# Patient Record
Sex: FEMALE | Race: ASIAN | NOT HISPANIC OR LATINO | URBAN - METROPOLITAN AREA
[De-identification: names, ages, dates, MRNs, and addresses within clinical notes are randomized per-mention and may not be internally consistent; named-entity substitution may affect disease eponyms.]

---

## 2021-03-27 LAB — HBA1C MFR BLD HPLC: 7.5 %

## 2021-04-05 ENCOUNTER — OFFICE VISIT (OUTPATIENT)
Dept: FAMILY MEDICINE CLINIC | Facility: CLINIC | Age: 56
End: 2021-04-05
Payer: COMMERCIAL

## 2021-04-05 VITALS
DIASTOLIC BLOOD PRESSURE: 90 MMHG | HEART RATE: 100 BPM | BODY MASS INDEX: 26.7 KG/M2 | TEMPERATURE: 98.6 F | SYSTOLIC BLOOD PRESSURE: 170 MMHG | WEIGHT: 136 LBS | HEIGHT: 60 IN | OXYGEN SATURATION: 98 %

## 2021-04-05 DIAGNOSIS — Z12.11 ENCOUNTER FOR SCREENING COLONOSCOPY: ICD-10-CM

## 2021-04-05 DIAGNOSIS — Z11.59 NEED FOR HEPATITIS C SCREENING TEST: ICD-10-CM

## 2021-04-05 DIAGNOSIS — E11.65 TYPE 2 DIABETES MELLITUS WITH HYPERGLYCEMIA, WITHOUT LONG-TERM CURRENT USE OF INSULIN (HCC): Primary | ICD-10-CM

## 2021-04-05 DIAGNOSIS — Z23 ENCOUNTER FOR IMMUNIZATION: ICD-10-CM

## 2021-04-05 DIAGNOSIS — R03.0 ELEVATED BLOOD PRESSURE READING IN OFFICE WITHOUT DIAGNOSIS OF HYPERTENSION: ICD-10-CM

## 2021-04-05 DIAGNOSIS — H91.93 BILATERAL HEARING LOSS, UNSPECIFIED HEARING LOSS TYPE: ICD-10-CM

## 2021-04-05 DIAGNOSIS — Z12.31 ENCOUNTER FOR SCREENING MAMMOGRAM FOR BREAST CANCER: ICD-10-CM

## 2021-04-05 DIAGNOSIS — E11.65 TYPE 2 DIABETES MELLITUS WITH HYPERGLYCEMIA, WITHOUT LONG-TERM CURRENT USE OF INSULIN (HCC): ICD-10-CM

## 2021-04-05 DIAGNOSIS — E66.3 OVERWEIGHT WITH BODY MASS INDEX (BMI) OF 26 TO 26.9 IN ADULT: ICD-10-CM

## 2021-04-05 PROCEDURE — 3008F BODY MASS INDEX DOCD: CPT | Performed by: FAMILY MEDICINE

## 2021-04-05 PROCEDURE — 99204 OFFICE O/P NEW MOD 45 MIN: CPT | Performed by: FAMILY MEDICINE

## 2021-04-05 PROCEDURE — 3725F SCREEN DEPRESSION PERFORMED: CPT | Performed by: FAMILY MEDICINE

## 2021-04-05 PROCEDURE — 1036F TOBACCO NON-USER: CPT | Performed by: FAMILY MEDICINE

## 2021-04-05 PROCEDURE — 90715 TDAP VACCINE 7 YRS/> IM: CPT | Performed by: FAMILY MEDICINE

## 2021-04-05 PROCEDURE — 90732 PPSV23 VACC 2 YRS+ SUBQ/IM: CPT | Performed by: FAMILY MEDICINE

## 2021-04-05 PROCEDURE — 90472 IMMUNIZATION ADMIN EACH ADD: CPT | Performed by: FAMILY MEDICINE

## 2021-04-05 PROCEDURE — 90471 IMMUNIZATION ADMIN: CPT | Performed by: FAMILY MEDICINE

## 2021-04-05 RX ORDER — METHOTREXATE 2.5 MG/1
TABLET ORAL
COMMUNITY
Start: 2021-02-08

## 2021-04-06 PROBLEM — E11.65 TYPE 2 DIABETES MELLITUS WITH HYPERGLYCEMIA, WITHOUT LONG-TERM CURRENT USE OF INSULIN (HCC): Status: ACTIVE | Noted: 2021-04-06

## 2021-04-06 PROBLEM — H91.93 BILATERAL HEARING LOSS: Status: ACTIVE | Noted: 2021-04-06

## 2021-04-06 PROBLEM — R03.0 ELEVATED BLOOD PRESSURE READING IN OFFICE WITHOUT DIAGNOSIS OF HYPERTENSION: Status: ACTIVE | Noted: 2021-04-06

## 2021-04-06 PROBLEM — E66.3 OVERWEIGHT WITH BODY MASS INDEX (BMI) OF 26 TO 26.9 IN ADULT: Status: ACTIVE | Noted: 2021-04-06

## 2021-05-16 ENCOUNTER — IMMUNIZATIONS (OUTPATIENT)
Dept: FAMILY MEDICINE CLINIC | Facility: HOSPITAL | Age: 56
End: 2021-05-16

## 2021-05-16 DIAGNOSIS — Z23 ENCOUNTER FOR IMMUNIZATION: Primary | ICD-10-CM

## 2021-05-16 PROCEDURE — 0001A SARS-COV-2 / COVID-19 MRNA VACCINE (PFIZER-BIONTECH) 30 MCG: CPT

## 2021-05-16 PROCEDURE — 91300 SARS-COV-2 / COVID-19 MRNA VACCINE (PFIZER-BIONTECH) 30 MCG: CPT

## 2021-06-05 ENCOUNTER — IMMUNIZATIONS (OUTPATIENT)
Dept: FAMILY MEDICINE CLINIC | Facility: HOSPITAL | Age: 56
End: 2021-06-05

## 2021-06-05 DIAGNOSIS — Z23 ENCOUNTER FOR IMMUNIZATION: Primary | ICD-10-CM

## 2021-06-05 PROCEDURE — 91300 SARS-COV-2 / COVID-19 MRNA VACCINE (PFIZER-BIONTECH) 30 MCG: CPT

## 2021-06-05 PROCEDURE — 0002A SARS-COV-2 / COVID-19 MRNA VACCINE (PFIZER-BIONTECH) 30 MCG: CPT

## 2021-06-19 ENCOUNTER — APPOINTMENT (OUTPATIENT)
Dept: LAB | Facility: CLINIC | Age: 56
End: 2021-06-19
Payer: COMMERCIAL

## 2021-06-19 DIAGNOSIS — Z11.59 NEED FOR HEPATITIS C SCREENING TEST: ICD-10-CM

## 2021-06-19 DIAGNOSIS — E11.65 TYPE 2 DIABETES MELLITUS WITH HYPERGLYCEMIA, WITHOUT LONG-TERM CURRENT USE OF INSULIN (HCC): ICD-10-CM

## 2021-06-19 LAB
ANION GAP SERPL CALCULATED.3IONS-SCNC: 10 MMOL/L (ref 4–13)
BUN SERPL-MCNC: 17 MG/DL (ref 5–25)
CALCIUM SERPL-MCNC: 8.8 MG/DL (ref 8.3–10.1)
CHLORIDE SERPL-SCNC: 105 MMOL/L (ref 100–108)
CHOLEST SERPL-MCNC: 191 MG/DL (ref 50–200)
CO2 SERPL-SCNC: 26 MMOL/L (ref 21–32)
CREAT SERPL-MCNC: 0.75 MG/DL (ref 0.6–1.3)
CREAT UR-MCNC: 97.5 MG/DL
EST. AVERAGE GLUCOSE BLD GHB EST-MCNC: 137 MG/DL
GFR SERPL CREATININE-BSD FRML MDRD: 88 ML/MIN/1.73SQ M
GLUCOSE P FAST SERPL-MCNC: 120 MG/DL (ref 65–99)
HBA1C MFR BLD: 6.4 %
HCV AB SER QL: NORMAL
HDLC SERPL-MCNC: 50 MG/DL
LDLC SERPL CALC-MCNC: 125 MG/DL (ref 0–100)
MICROALBUMIN UR-MCNC: 22.8 MG/L (ref 0–20)
MICROALBUMIN/CREAT 24H UR: 23 MG/G CREATININE (ref 0–30)
NONHDLC SERPL-MCNC: 141 MG/DL
POTASSIUM SERPL-SCNC: 4.2 MMOL/L (ref 3.5–5.3)
SODIUM SERPL-SCNC: 141 MMOL/L (ref 136–145)
TRIGL SERPL-MCNC: 80 MG/DL

## 2021-06-19 PROCEDURE — 3061F NEG MICROALBUMINURIA REV: CPT | Performed by: FAMILY MEDICINE

## 2021-06-19 PROCEDURE — 82043 UR ALBUMIN QUANTITATIVE: CPT

## 2021-06-19 PROCEDURE — 80048 BASIC METABOLIC PNL TOTAL CA: CPT

## 2021-06-19 PROCEDURE — 83036 HEMOGLOBIN GLYCOSYLATED A1C: CPT

## 2021-06-19 PROCEDURE — 36415 COLL VENOUS BLD VENIPUNCTURE: CPT

## 2021-06-19 PROCEDURE — 80061 LIPID PANEL: CPT

## 2021-06-19 PROCEDURE — 3044F HG A1C LEVEL LT 7.0%: CPT | Performed by: FAMILY MEDICINE

## 2021-06-19 PROCEDURE — 82570 ASSAY OF URINE CREATININE: CPT

## 2021-06-19 PROCEDURE — 86803 HEPATITIS C AB TEST: CPT

## 2021-08-05 ENCOUNTER — OFFICE VISIT (OUTPATIENT)
Dept: GASTROENTEROLOGY | Facility: CLINIC | Age: 56
End: 2021-08-05
Payer: COMMERCIAL

## 2021-08-05 VITALS
WEIGHT: 127 LBS | HEART RATE: 81 BPM | DIASTOLIC BLOOD PRESSURE: 114 MMHG | BODY MASS INDEX: 24.94 KG/M2 | SYSTOLIC BLOOD PRESSURE: 184 MMHG | HEIGHT: 60 IN

## 2021-08-05 DIAGNOSIS — Z12.11 ENCOUNTER FOR SCREENING COLONOSCOPY: ICD-10-CM

## 2021-08-05 DIAGNOSIS — Z12.12 ENCOUNTER FOR SCREENING FOR MALIGNANT NEOPLASM OF RECTUM: Primary | ICD-10-CM

## 2021-08-05 DIAGNOSIS — M06.9 RHEUMATOID ARTHRITIS, INVOLVING UNSPECIFIED SITE, UNSPECIFIED WHETHER RHEUMATOID FACTOR PRESENT (HCC): ICD-10-CM

## 2021-08-05 PROCEDURE — 99244 OFF/OP CNSLTJ NEW/EST MOD 40: CPT | Performed by: INTERNAL MEDICINE

## 2021-08-05 RX ORDER — FOLIC ACID 1 MG/1
TABLET ORAL DAILY
COMMUNITY

## 2021-08-05 NOTE — PROGRESS NOTES
[facility] - Outpatient Consultation  Clarita Villegas 60 y o  female MRN: [id number]  Encounter: [id number]          ASSESSMENT AND PLAN:      1  Encounter for screening for malignant neoplasm of rectum    2  Encounter for screening colonoscopy  -     Ambulatory referral for colonoscopy    3  Rheumatoid arthritis, involving unspecified site, unspecified whether rheumatoid factor present [facility])      Patient evaluated for screening for colorectal cancer, colonoscopy procedure, options and prep discussed at length with patient and her son  Schedule 1st screening colonoscopy  But they want to wait since she is traveling to [facility] will be back in October, she wants to call at that time to schedule   ______________________________________________________________________    HPI:    Thank you for asking me to see this patient for evaluation for screening for colorectal cancer  She was accompanied by her son who helped her communicate  Patient denies any blood in stools melena hematochezia abdominal pain nausea vomiting dysphagia coughing choking spells excessive diarrhea constipation acid reflux heartburn indigestion nocturnal symptoms bronchitis pneumonias  Appetite is fair weight stable  Denies any fever chills rash psych ENT  problems  Diabetes well managed  Has lost some weight  Diet medications more than 10 pertinent systems were reviewed  REVIEW OF SYSTEMS:    CONSTITUTIONAL: Denies any fever, chills, rigors, and weight loss  HEENT: No earache or tinnitus  CARDIOVASCULAR: No chest pain or palpitations  RESPIRATORY: Denies any cough, hemoptysis, shortness of breath or dyspnea on exertion  GASTROINTESTINAL: As noted in the History of Present Illness  GENITOURINARY: Denies any hematuria or dysuria  NEUROLOGIC: No dizziness or vertigo  MUSCULOSKELETAL: Denies any joint swellings  SKIN: Denies skin rashes or itching     ENDOCRINE: Denies excessive thirst  Denies intolerance to heat or cold  PSYCHOSOCIAL: Denies depression or anxiety  Denies any recent memory loss  Historical Information   Past Medical History:   Diagnosis Date    Diabetes mellitus ([facility] )     Rheumatoid arthritis ([facility] )     Rheumatoid arthritis ([facility] )      History reviewed  No pertinent surgical history  Social History   Social History     Substance and Sexual Activity   Alcohol Use Not Currently     Social History     Substance and Sexual Activity   Drug Use Not on file     Social History     Tobacco Use   Smoking Status Never Smoker   Smokeless Tobacco Never Used     Family History   Problem Relation Age of Onset    Diabetes Father        Meds/Allergies       Current Outpatient Medications:     folic acid (FOLVITE) 1 mg tablet    metFORMIN (GLUCOPHAGE) 500 mg tablet    methotrexate 2 5 MG tablet    No Known Allergies        Objective     Blood pressure (!) 184/114, pulse 81, height 5' (1 524 m), weight 57 6 kg (127 lb)  Body mass index is 24 8 kg/m²  PHYSICAL EXAM:      General Appearance:   Alert, cooperative, no distress   HEENT:   Normocephalic, atraumatic, anicteric  Neck:  Supple, symmetrical, trachea midline   Lungs:   Clear to auscultation bilaterally; no rales, rhonchi or wheezing; respirations unlabored    Heart[de-identified]   Regular rate and rhythm; no murmur  Abdomen:   Soft, non-tender, non-distended; normal bowel sounds; no masses, no organomegaly    Genitalia:   Deferred    Rectal:   Deferred    Extremities:  No cyanosis, clubbing or edema    Skin:  No jaundice, rashes, or lesions    Lymph nodes:  No palpable cervical lymphadenopathy        Lab Results:   No visits with results within 1 Day(s) from this visit     Latest known visit with results is:   Appointment on 06/19/2021   Component Date Value    Cholesterol 06/19/2021 191     Triglycerides 06/19/2021 80     HDL, Direct 06/19/2021 50     LDL Calculated 06/19/2021 125*    Non-HDL-Chol (CHOL-HDL) 06/19/2021 141     Hemoglobin A1C 06/19/2021 6 4*    EAG 06/19/2021 137     Creatinine, Ur 06/19/2021 97 5     Microalbum  ,U,Random 06/19/2021 22 8*    Microalb Creat Ratio 06/19/2021 23     Sodium 06/19/2021 141     Potassium 06/19/2021 4 2     Chloride 06/19/2021 105     CO2 06/19/2021 26     ANION GAP 06/19/2021 10     BUN 06/19/2021 17     Creatinine 06/19/2021 0 75     Glucose, Fasting 06/19/2021 120*    Calcium 06/19/2021 8 8     eGFR 06/19/2021 88     Hepatitis C Ab 06/19/2021 Non-reactive          Radiology Results:   No results found

## 2021-08-09 ENCOUNTER — OFFICE VISIT (OUTPATIENT)
Dept: FAMILY MEDICINE CLINIC | Facility: CLINIC | Age: 56
End: 2021-08-09
Payer: COMMERCIAL

## 2021-08-09 VITALS
RESPIRATION RATE: 16 BRPM | HEIGHT: 60 IN | OXYGEN SATURATION: 98 % | DIASTOLIC BLOOD PRESSURE: 100 MMHG | SYSTOLIC BLOOD PRESSURE: 172 MMHG | TEMPERATURE: 97.5 F | WEIGHT: 128 LBS | HEART RATE: 88 BPM | BODY MASS INDEX: 25.13 KG/M2

## 2021-08-09 DIAGNOSIS — I10 ESSENTIAL HYPERTENSION: Primary | ICD-10-CM

## 2021-08-09 DIAGNOSIS — Z00.01 ENCOUNTER FOR GENERAL ADULT MEDICAL EXAMINATION WITH ABNORMAL FINDINGS: ICD-10-CM

## 2021-08-09 DIAGNOSIS — E78.2 MIXED HYPERLIPIDEMIA: ICD-10-CM

## 2021-08-09 DIAGNOSIS — E66.3 OVERWEIGHT WITH BODY MASS INDEX (BMI) OF 25 TO 25.9 IN ADULT: ICD-10-CM

## 2021-08-09 DIAGNOSIS — E11.65 TYPE 2 DIABETES MELLITUS WITH HYPERGLYCEMIA, WITHOUT LONG-TERM CURRENT USE OF INSULIN (HCC): ICD-10-CM

## 2021-08-09 DIAGNOSIS — Z12.4 CERVICAL CANCER SCREENING: ICD-10-CM

## 2021-08-09 PROCEDURE — 3725F SCREEN DEPRESSION PERFORMED: CPT | Performed by: FAMILY MEDICINE

## 2021-08-09 PROCEDURE — 1036F TOBACCO NON-USER: CPT | Performed by: FAMILY MEDICINE

## 2021-08-09 PROCEDURE — 3008F BODY MASS INDEX DOCD: CPT | Performed by: FAMILY MEDICINE

## 2021-08-09 PROCEDURE — 99214 OFFICE O/P EST MOD 30 MIN: CPT | Performed by: FAMILY MEDICINE

## 2021-08-09 PROCEDURE — 99396 PREV VISIT EST AGE 40-64: CPT | Performed by: FAMILY MEDICINE

## 2021-08-09 RX ORDER — ATORVASTATIN CALCIUM 40 MG/1
40 TABLET, FILM COATED ORAL DAILY
Qty: 90 TABLET | Refills: 0 | Status: SHIPPED | OUTPATIENT
Start: 2021-08-09 | End: 2022-01-06

## 2021-08-09 NOTE — PROGRESS NOTES
Subjective:      Patient ID: Paulette Hayes is a 60 y o  female  Here follow up Gage Penaloza, her BP has been running high-170s-180s/100-110s   Rheumatoid arthritis- diagnosed few years ago, sees Dr Elizabeth Schneider and is on chronic methotrexate therapy, at the time of diagnosis she was on chronic prednisone and now has not required prednisone in over 2-3 years  She gets her routine labs every 3 months from rheumatology ,last labs in 6/2021  Type 2 diabetes mellitus-diagnosed likely prior to 2019,she has been compliant with metformin and tolerating it well, she has lost weight and has been compliant with diet and exercise- son wants her off medications if possible in future  Essential hypertension- I had previously told her that her BP is high and she will need medicatoins, however the son who works in Cardiology lab for [employer] states that he checked it at home and it was fine for 1 week and hence he thought it was not necessary to follow up, she has been more compliant with diet  At her most recent appointment with Dr Belle Beebe it was elevated and Wesley Mackenzie had contacted me on tiger connect over the weekend , she was asymptomatic and hence I had advised her to come into the office today  She denies any headache, chest pain, shortness of breath, but does feel tired and reports lack of appetite for few days  No visual changes  Overweight-she does watch her diet and eats simple meals, she does not exercise formally but enjoys house work and walking      Past Medical History:   Diagnosis Date    Diabetes mellitus ([facility] )     Rheumatoid arthritis ([facility] )     Rheumatoid arthritis ([facility] )        Family History   Problem Relation Age of Onset    Diabetes Father        History reviewed  No pertinent surgical history  reports that she has never smoked  She has never used smokeless tobacco  She reports previous alcohol use        Current Outpatient Medications:     folic acid (FOLVITE) 1 mg tablet, Take by mouth daily, Disp: , Rfl:     metFORMIN (GLUCOPHAGE) 1000 MG tablet, Take 1 tablet (1,000 mg total) by mouth 2 (two) times a day with meals, Disp: 180 tablet, Rfl: 0    methotrexate 2 5 MG tablet, 3 tablet once a week, Disp: , Rfl:     atorvastatin (LIPITOR) 40 mg tablet, Take 1 tablet (40 mg total) by mouth daily, Disp: 90 tablet, Rfl: 0    benazepril-hydrochlorthiazide (LOTENSIN HCT) 20-25 MG per tablet, Take 1 tablet by mouth daily, Disp: 30 tablet, Rfl: 2    The following portions of the patient's history were reviewed and updated as appropriate: allergies, current medications, past family history, past medical history, past social history, past surgical history and problem list     Review of Systems   Constitutional: Positive for appetite change and fatigue  Negative for fever  HENT: Negative for congestion, facial swelling, mouth sores, rhinorrhea, sore throat and trouble swallowing  Eyes: Negative for pain and redness  Respiratory: Negative for cough, shortness of breath and wheezing  Cardiovascular: Negative for chest pain, palpitations and leg swelling  Gastrointestinal: Negative for abdominal pain, blood in stool, constipation, diarrhea and nausea  Genitourinary: Negative for dysuria, hematuria and urgency  Musculoskeletal: Negative for arthralgias, back pain and myalgias  Skin: Negative for rash and wound  Neurological: Negative for seizures, syncope and headaches  Hematological: Negative for adenopathy  Psychiatric/Behavioral: Negative for agitation and behavioral problems           PHQ-9 Depression Screening    PHQ-9:   Frequency of the following problems over the past two weeks:      Little interest or pleasure in doing things: 0 - not at all  Feeling down, depressed, or hopeless: 0 - not at all  PHQ-2 Score: 0       [unfilled]    Objective:    BP (!) 172/100 (BP Location: Right arm, Patient Position: Sitting, Cuff Size: Adult)   Pulse 88   Temp 97 5 °F (36 4 °C) (Temporal)   Resp 16   Ht 5' (1 524 m)   Wt 58 1 kg (128 lb)   SpO2 98%   BMI 25 00 kg/m²      Physical Exam  Vitals and nursing note reviewed  Constitutional:       Appearance: Normal appearance  She is well-developed  She is not ill-appearing  HENT:      Head: Normocephalic and atraumatic  Right Ear: External ear normal       Left Ear: External ear normal       Nose: Nose normal       Mouth/Throat:      Mouth: Mucous membranes are moist       Pharynx: No oropharyngeal exudate or posterior oropharyngeal erythema  Eyes:      General: No scleral icterus  Right eye: No discharge  Left eye: No discharge  Conjunctiva/sclera: Conjunctivae normal    Cardiovascular:      Rate and Rhythm: Normal rate  Heart sounds: No murmur heard  No gallop  Pulmonary:      Effort: Pulmonary effort is normal  No respiratory distress  Breath sounds: Normal breath sounds  No stridor  No wheezing, rhonchi or rales  Musculoskeletal:         General: No tenderness or deformity  Skin:     Findings: No erythema or rash  Neurological:      Mental Status: She is alert  Mental status is at baseline  Psychiatric:         Behavior: Behavior normal          Judgment: Judgment normal            Recent Results (from the past 2520 hour(s))   Lipid panel    Collection Time: 06/19/21  9:37 AM   Result Value Ref Range    Cholesterol 191 50 - 200 mg/dL    Triglycerides 80 <=150 mg/dL    HDL, Direct 50 >=40 mg/dL    LDL Calculated 125 (H) 0 - 100 mg/dL    Non-HDL-Chol (CHOL-HDL) 141 mg/dl   HEMOGLOBIN A1C W/ EAG ESTIMATION    Collection Time: 06/19/21  9:37 AM   Result Value Ref Range    Hemoglobin A1C 6 4 (H) Normal 3 8-5 6%; PreDiabetic 5 7-6 4%; Diabetic >=6 5%; Glycemic control for adults with diabetes <7 0% %     mg/dl   Microalbumin / creatinine urine ratio    Collection Time: 06/19/21  9:37 AM   Result Value Ref Range    Creatinine, Ur 97 5 mg/dL    Microalbum  ,U,Random 22 8 (H) 0 0 - 20 0 mg/L    Microalb Creat Ratio 23 0 - 30 mg/g creatinine   Basic metabolic panel    Collection Time: 06/19/21  9:37 AM   Result Value Ref Range    Sodium 141 136 - 145 mmol/L    Potassium 4 2 3 5 - 5 3 mmol/L    Chloride 105 100 - 108 mmol/L    CO2 26 21 - 32 mmol/L    ANION GAP 10 4 - 13 mmol/L    BUN 17 5 - 25 mg/dL    Creatinine 0 75 0 60 - 1 30 mg/dL    Glucose, Fasting 120 (H) 65 - 99 mg/dL    Calcium 8 8 8 3 - 10 1 mg/dL    eGFR 88 ml/min/1 73sq m   Hepatitis C Antibody ([facility])    Collection Time: 06/19/21  9:37 AM   Result Value Ref Range    Hepatitis C Ab Non-reactive Non-reactive       Laboratory Results: I have personally reviewed the pertinent laboratory results/reports     Radiology/Other Diagnostic Testing Results: I have personally reviewed pertinent reports  Assessment/Plan:         Diagnoses and all orders for this visit:    Essential hypertension  -     Basic metabolic panel; Future  -     CBC and differential; Future  -     benazepril-hydrochlorthiazide (LOTENSIN HCT) 20-25 MG per tablet; Take 1 tablet by mouth daily    Mixed hyperlipidemia  -     atorvastatin (LIPITOR) 40 mg tablet; Take 1 tablet (40 mg total) by mouth daily    Type 2 diabetes mellitus with hyperglycemia, without long-term current use of insulin (HCC)  -     metFORMIN (GLUCOPHAGE) 1000 MG tablet; Take 1 tablet (1,000 mg total) by mouth 2 (two) times a day with meals  -     Comprehensive metabolic panel; Future  -     CBC and differential; Future  -     HEMOGLOBIN A1C W/ EAG ESTIMATION; Future    Encounter for general adult medical examination with abnormal findings    Cervical cancer screening  -     Ambulatory referral to Gynecology; Future    Overweight with body mass index (BMI) of 25 to 25 9 in adult      Discussed that given ASCVD risk is high due to BP elevation she needs statin, start atorvastatin 40 mg daily -discussed to report any myalgias and stay hydrated    Start benazepril-hctz for hypertension, <2000 mg sodium in diet, daily 20 min exercise and repeat BMP in 3-4 weeks after starting ACEI, discussed common side effects  Refer for PAP with Dr Brody Mckay  F/u IN 4 WEEKS FOR RN visit for BP check and with me in 3 months  BP today:  Vitals:    08/09/21 0837   BP: (!) 172/100   Pulse: 88   Resp: 16   Temp: 97 5 °F (36 4 °C)   SpO2: 98%     Current blood pressure goal based on your age and co-morbidities is 140/90 mm Hg  Currently blood pressure is not at goal    Continue taking your new medications  Discussed red flag symptoms including intractable throbbing headache, visual changes, blurred vision, tingling numbness of any extremity, neurological signs like facial droop, slurred speech, chest pain  Call 9-1-1 and go to ER if these develop  Patient understood and verbalized understanding  Discussed to take metformin once daily as she is fatigued and appetite has decreased- likely due to metformin and tight BG control  Read package inserts for all medications before starting a new medications, call me if you have any questions  Patient was given opportunity to ask questions and all questions were answered  Portions of the record may have been created with voice recognition software  Occasional wrong word or "sound a like" substitutions may have occurred due to the inherent limitations of voice recognition software  Read the chart carefully and recognize, using context, where substitutions have occurred

## 2021-08-09 NOTE — PROGRESS NOTES
[facility]    NAME: Aisha Kinney  AGE: 60 y o  SEX: female  : 1965     DATE: 2021     Assessment and Plan:     Problem List Items Addressed This Visit        Endocrine    Type 2 diabetes mellitus with hyperglycemia, without long-term current use of insulin (HCC)    Relevant Medications    metFORMIN (GLUCOPHAGE) 1000 MG tablet    Other Relevant Orders    Comprehensive metabolic panel    CBC and differential    HEMOGLOBIN A1C W/ EAG ESTIMATION      Other Visit Diagnoses     Encounter for general adult medical examination with abnormal findings    -  Primary    Mixed hyperlipidemia        Relevant Medications    atorvastatin (LIPITOR) 40 mg tablet    Cervical cancer screening        Relevant Orders    Ambulatory referral to Gynecology    Essential hypertension        Relevant Orders    Basic metabolic panel    CBC and differential    Overweight with body mass index (BMI) of 25 to 25 9 in adult            Advised to schedule Eye exam, mammogram and PAP smear= previously saw Dr Ted Keene will do her colonoscopy in Oct after returning from [facility]  See EM visit for other problems  Immunizations and preventive care screenings were discussed with patient today  Appropriate education was printed on patient's after visit summary  Counseling:  Dental Health: discussed importance of regular tooth brushing, flossing, and dental visits  Exercise: the importance of regular exercise/physical activity was discussed  Recommend exercise 3-5 times per week for at least 30 minutes  · Diabetic foot care    BMI Counseling: Body mass index is 25 kg/m²   The BMI is above normal  Nutrition recommendations include decreasing portion sizes, encouraging healthy choices of fruits and vegetables, decreasing fast food intake, consuming healthier snacks, limiting drinks that contain sugar, moderation in carbohydrate intake and reducing intake of cholesterol  Exercise recommendations include moderate physical activity 150 minutes/week  No pharmacotherapy was ordered  Return in about 3 months (around 11/9/2021) for Next scheduled follow up  Chief Complaint:     Chief Complaint   Patient presents with    Hypertension      History of Present Illness:     Adult Annual Physical   Patient here for a comprehensive physical exam  The patient reports problems - blood pressure running high-see EM visit  Diet and Physical Activity  · Diet/Nutrition: well balanced diet, low fat diet, low carb diet and consuming 3-5 servings of fruits/vegetables daily  · Exercise: walking  Depression Screening  PHQ-9 Depression Screening    PHQ-9:   Frequency of the following problems over the past two weeks:      Little interest or pleasure in doing things: 0 - not at all  Feeling down, depressed, or hopeless: 0 - not at all  PHQ-2 Score: 0       General Health  · Sleep: sleeps well  · Hearing: decreased bilateral   · Vision: no vision problems, most recent eye exam >1 year ago and has not scheduled diabetic eye exam yet  · Dental: regular dental visits  /GYN Health  · Patient is: postmenopausal  ·   · Contraceptive method: not sexually active   Review of Systems:     Review of Systems   Constitutional: Positive for appetite change and fatigue  Negative for fever  HENT: Negative for congestion, facial swelling, mouth sores, rhinorrhea, sore throat and trouble swallowing  Eyes: Negative for pain and redness  Respiratory: Negative for cough, shortness of breath and wheezing  Cardiovascular: Negative for chest pain, palpitations and leg swelling  Gastrointestinal: Negative for abdominal pain, blood in stool, constipation, diarrhea and nausea  Genitourinary: Negative for dysuria, hematuria and urgency  Musculoskeletal: Negative for arthralgias, back pain and myalgias  Skin: Negative for rash and wound  Neurological: Negative for seizures, syncope and headaches  Hematological: Negative for adenopathy  Psychiatric/Behavioral: Negative for agitation and behavioral problems  Past Medical History:     Past Medical History:   Diagnosis Date    Diabetes mellitus ([facility] )     Rheumatoid arthritis ([facility] )     Rheumatoid arthritis ([facility] )       Past Surgical History:     History reviewed  No pertinent surgical history  Social History:     Social History     Socioeconomic History    Marital status: Unknown     Spouse name: None    Number of children: None    Years of education: None    Highest education level: None   Occupational History    None   Tobacco Use    Smoking status: Never Smoker    Smokeless tobacco: Never Used   Vaping Use    Vaping Use: Never used   Substance and Sexual Activity    Alcohol use: Not Currently    Drug use: None    Sexual activity: None   Other Topics Concern    None   Social History Narrative    None     Social Determinants of Health     Financial Resource Strain:     Difficulty of Paying Living Expenses:    Food Insecurity:     Worried About Running Out of Food in the Last Year:     Ran Out of Food in the Last Year:    Transportation Needs:     Lack of Transportation (Medical):      Lack of Transportation (Non-Medical):    Physical Activity:     Days of Exercise per Week:     Minutes of Exercise per Session:    Stress:     Feeling of Stress :    Social Connections:     Frequency of Communication with Friends and Family:     Frequency of Social Gatherings with Friends and Family:     Attends [employer] Services:     Active Member of Clubs or Organizations:     Attends Club or Organization Meetings:     Marital Status:    Intimate Partner Violence:     Fear of Current or Ex-Partner:     Emotionally Abused:     Physically Abused:     Sexually Abused:       Family History:     Family History   Problem Relation Age of Onset    Diabetes Father       Current Medications:     Current Outpatient Medications   Medication Sig Dispense Refill    folic acid (FOLVITE) 1 mg tablet Take by mouth daily      metFORMIN (GLUCOPHAGE) 1000 MG tablet Take 1 tablet (1,000 mg total) by mouth 2 (two) times a day with meals 180 tablet 0    methotrexate 2 5 MG tablet 3 tablet once a week      atorvastatin (LIPITOR) 40 mg tablet Take 1 tablet (40 mg total) by mouth daily 90 tablet 0     No current facility-administered medications for this visit  Allergies:     No Known Allergies   Physical Exam:     BP (!) 172/100 (BP Location: Right arm, Patient Position: Sitting, Cuff Size: Adult)   Pulse 88   Temp 97 5 °F (36 4 °C) (Temporal)   Resp 16   Ht 5' (1 524 m)   Wt 58 1 kg (128 lb)   SpO2 98%   BMI 25 00 kg/m²     Physical Exam  Vitals and nursing note reviewed  Constitutional:       Appearance: She is well-developed  HENT:      Head: Normocephalic and atraumatic  Right Ear: External ear normal       Left Ear: External ear normal       Nose: Nose normal       Mouth/Throat:      Pharynx: No oropharyngeal exudate  Eyes:      General: No scleral icterus  Right eye: No discharge  Left eye: No discharge  Conjunctiva/sclera: Conjunctivae normal       Pupils: Pupils are equal, round, and reactive to light  Neck:      Thyroid: No thyromegaly  Vascular: No JVD  Cardiovascular:      Rate and Rhythm: Normal rate and regular rhythm  Heart sounds: No murmur heard  No friction rub  No gallop  Pulmonary:      Effort: Pulmonary effort is normal  No respiratory distress  Breath sounds: Normal breath sounds  No wheezing or rales  Abdominal:      Palpations: Abdomen is soft  Tenderness: There is no abdominal tenderness  Musculoskeletal:         General: No tenderness or deformity  Cervical back: Normal range of motion  Lymphadenopathy:      Cervical: No cervical adenopathy  Skin:     General: Skin is warm        Capillary Refill: Capillary refill takes less than 2 seconds  Findings: No erythema or rash  Neurological:      Mental Status: She is alert  Mental status is at baseline  Psychiatric:         Behavior: Behavior normal          Thought Content:  Thought content normal          Judgment: Judgment normal           Raiza Pop MD  [address] [place] [address]

## 2021-08-09 NOTE — PATIENT INSTRUCTIONS
Wellness Visit for Adults   AMBULATORY CARE:   A wellness visit  is when you see your healthcare provider to get screened for health problems  Your healthcare provider will also give you advice on how to stay healthy  Write down your questions so you remember to ask them  Ask your healthcare provider how often you should have a wellness visit  What happens at a wellness visit:  Your healthcare provider will ask about your health, and your family history of health problems  This includes high blood pressure, heart disease, and cancer  He or she will ask if you have symptoms that concern you, if you smoke, and about your mood  You may also be asked about your intake of medicines, supplements, food, and alcohol  Any of the following may be done:  · Your weight  will be checked  Your height may also be checked so your body mass index (BMI) can be calculated  Your BMI shows if you are at a healthy weight  · Your blood pressure  and heart rate will be checked  Your temperature may also be checked  · Blood and urine tests  may be done  Blood tests may be done to check your cholesterol levels  Abnormal cholesterol levels increase your risk for heart disease and stroke  You may also need a blood or urine test to check for diabetes if you are at increased risk  Urine tests may be done to look for signs of an infection or kidney disease  · A physical exam  includes checking your heartbeat and lungs with a stethoscope  Your healthcare provider may also check your skin to look for sun damage  · Screening tests  may be recommended  A screening test is done to check for diseases that may not cause symptoms  The screening tests you may need depend on your age, gender, family history, and lifestyle habits  For example, colorectal screening may be recommended if you are 48years old or older  Screening tests you need if you are a woman:   · A Pap smear  is used to screen for cervical cancer   Pap smears are usually who had chickenpox or have been exposed to the virus  How to eat healthy:  My Plate is a model for planning healthy meals  It shows the types and amounts of foods that should go on your plate  Fruits and vegetables make up about half of your plate, and grains and protein make up the other half  A serving of dairy is included on the side of your plate  The amount of calories and serving sizes you need depends on your age, gender, weight, and height  Examples of healthy foods are listed below:  · Eat a variety of vegetables  such as dark green, red, and orange vegetables  You can also include canned vegetables low in sodium (salt) and frozen vegetables without added butter or sauces  · Eat a variety of fresh fruits , canned fruit in 100% juice, frozen fruit, and dried fruit  · Include whole grains  At least half of the grains you eat should be whole grains  Examples include whole-wheat bread, wheat pasta, brown rice, and whole-grain cereals such as oatmeal     · Eat a variety of protein foods such as seafood (fish and shellfish), lean meat, and poultry without skin (turkey and chicken)  Examples of lean meats include pork leg, shoulder, or tenderloin, and beef round, sirloin, tenderloin, and extra lean ground beef  Other protein foods include eggs and egg substitutes, beans, peas, soy products, nuts, and seeds  · Choose low-fat dairy products such as skim or 1% milk or low-fat yogurt, cheese, and cottage cheese  · Limit unhealthy fats  such as butter, hard margarine, and shortening  Exercise:  Exercise at least 30 minutes per day on most days of the week  Some examples of exercise include walking, biking, dancing, and swimming  You can also fit in more physical activity by taking the stairs instead of the elevator or parking farther away from stores  Include muscle strengthening activities 2 days each week  Regular exercise provides many health benefits   It helps you manage your weight, and decreases your risk for type 2 diabetes, heart disease, stroke, and high blood pressure  Exercise can also help improve your mood  Ask your healthcare provider about the best exercise plan for you  General health and safety guidelines:   · Do not smoke  Nicotine and other chemicals in cigarettes and cigars can cause lung damage  Ask your healthcare provider for information if you currently smoke and need help to quit  E-cigarettes or smokeless tobacco still contain nicotine  Talk to your healthcare provider before you use these products  · Limit alcohol  A drink of alcohol is 12 ounces of beer, 5 ounces of wine, or 1½ ounces of liquor  · Lose weight, if needed  Being overweight increases your risk of certain health conditions  These include heart disease, high blood pressure, type 2 diabetes, and certain types of cancer  · Protect your skin  Do not sunbathe or use tanning beds  Use sunscreen with a SPF 15 or higher  Apply sunscreen at least 15 minutes before you go outside  Reapply sunscreen every 2 hours  Wear protective clothing, hats, and sunglasses when you are outside  · Drive safely  Always wear your seatbelt  Make sure everyone in your car wears a seatbelt  A seatbelt can save your life if you are in an accident  Do not use your cell phone when you are driving  This could distract you and cause an accident  Pull over if you need to make a call or send a text message  · Practice safe sex  Use latex condoms if are sexually active and have more than one partner  Your healthcare provider may recommend screening tests for sexually transmitted infections (STIs)  · Wear helmets, lifejackets, and protective gear  Always wear a helmet when you ride a bike or motorcycle, go skiing, or play sports that could cause a head injury  Wear protective equipment when you play sports  Wear a lifejacket when you are on a boat or doing water sports      © Copyright [employer] 2021 Information is for End User's use only and may not be sold, redistributed or otherwise used for commercial purposes  All illustrations and images included in CareNotes® are the copyrighted property of A D A M , Inc  or Ivana Ignacio  The above information is an  only  It is not intended as medical advice for individual conditions or treatments  Talk to your doctor, nurse or pharmacist before following any medical regimen to see if it is safe and effective for you

## 2021-08-10 ENCOUNTER — TELEPHONE (OUTPATIENT)
Dept: FAMILY MEDICINE CLINIC | Facility: CLINIC | Age: 56
End: 2021-08-10

## 2021-08-10 RX ORDER — BENAZEPRIL/HYDROCHLOROTHIAZIDE 20 MG-25MG
1 TABLET ORAL DAILY
Qty: 30 TABLET | Refills: 2 | Status: SHIPPED | OUTPATIENT
Start: 2021-08-10 | End: 2021-11-01

## 2021-08-28 ENCOUNTER — APPOINTMENT (OUTPATIENT)
Dept: LAB | Facility: CLINIC | Age: 56
End: 2021-08-28
Payer: COMMERCIAL

## 2021-08-28 DIAGNOSIS — M05.79 SEROPOSITIVE RHEUMATOID ARTHRITIS OF MULTIPLE SITES (HCC): ICD-10-CM

## 2021-08-28 DIAGNOSIS — Z79.899 ENCOUNTER FOR LONG-TERM (CURRENT) USE OF OTHER MEDICATIONS: ICD-10-CM

## 2021-08-28 LAB
ALBUMIN SERPL BCP-MCNC: 3.8 G/DL (ref 3.5–5)
ALP SERPL-CCNC: 89 U/L (ref 46–116)
ALT SERPL W P-5'-P-CCNC: 25 U/L (ref 12–78)
AST SERPL W P-5'-P-CCNC: 16 U/L (ref 5–45)
BASOPHILS # BLD AUTO: 0.08 THOUSANDS/ΜL (ref 0–0.1)
BASOPHILS NFR BLD AUTO: 1 % (ref 0–1)
BILIRUB DIRECT SERPL-MCNC: 0.13 MG/DL (ref 0–0.2)
BILIRUB SERPL-MCNC: 0.46 MG/DL (ref 0.2–1)
BUN SERPL-MCNC: 16 MG/DL (ref 5–25)
CREAT SERPL-MCNC: 0.96 MG/DL (ref 0.6–1.3)
CRP SERPL QL: 6.3 MG/L
EOSINOPHIL # BLD AUTO: 0.32 THOUSAND/ΜL (ref 0–0.61)
EOSINOPHIL NFR BLD AUTO: 5 % (ref 0–6)
ERYTHROCYTE [DISTWIDTH] IN BLOOD BY AUTOMATED COUNT: 13.8 % (ref 11.6–15.1)
ERYTHROCYTE [SEDIMENTATION RATE] IN BLOOD: 43 MM/HOUR (ref 0–29)
GFR SERPL CREATININE-BSD FRML MDRD: 65 ML/MIN/1.73SQ M
HCT VFR BLD AUTO: 38.4 % (ref 34.8–46.1)
HGB BLD-MCNC: 13.2 G/DL (ref 11.5–15.4)
IMM GRANULOCYTES # BLD AUTO: 0.03 THOUSAND/UL (ref 0–0.2)
IMM GRANULOCYTES NFR BLD AUTO: 0 % (ref 0–2)
LYMPHOCYTES # BLD AUTO: 1.9 THOUSANDS/ΜL (ref 0.6–4.47)
LYMPHOCYTES NFR BLD AUTO: 27 % (ref 14–44)
MCH RBC QN AUTO: 28.4 PG (ref 26.8–34.3)
MCHC RBC AUTO-ENTMCNC: 34.4 G/DL (ref 31.4–37.4)
MCV RBC AUTO: 83 FL (ref 82–98)
MONOCYTES # BLD AUTO: 0.61 THOUSAND/ΜL (ref 0.17–1.22)
MONOCYTES NFR BLD AUTO: 9 % (ref 4–12)
NEUTROPHILS # BLD AUTO: 4.17 THOUSANDS/ΜL (ref 1.85–7.62)
NEUTS SEG NFR BLD AUTO: 58 % (ref 43–75)
NRBC BLD AUTO-RTO: 0 /100 WBCS
PLATELET # BLD AUTO: 382 THOUSANDS/UL (ref 149–390)
PMV BLD AUTO: 9.7 FL (ref 8.9–12.7)
PROT SERPL-MCNC: 7.8 G/DL (ref 6.4–8.2)
RBC # BLD AUTO: 4.65 MILLION/UL (ref 3.81–5.12)
WBC # BLD AUTO: 7.11 THOUSAND/UL (ref 4.31–10.16)

## 2021-08-28 PROCEDURE — 85652 RBC SED RATE AUTOMATED: CPT

## 2021-08-28 PROCEDURE — 86140 C-REACTIVE PROTEIN: CPT

## 2021-08-28 PROCEDURE — 84520 ASSAY OF UREA NITROGEN: CPT

## 2021-08-28 PROCEDURE — 80076 HEPATIC FUNCTION PANEL: CPT

## 2021-08-28 PROCEDURE — 82565 ASSAY OF CREATININE: CPT

## 2021-08-28 PROCEDURE — 36415 COLL VENOUS BLD VENIPUNCTURE: CPT

## 2021-08-28 PROCEDURE — 85025 COMPLETE CBC W/AUTO DIFF WBC: CPT

## 2021-10-31 DIAGNOSIS — I10 ESSENTIAL HYPERTENSION: ICD-10-CM

## 2021-11-01 RX ORDER — BENAZEPRIL/HYDROCHLOROTHIAZIDE 20 MG-25MG
TABLET ORAL
Qty: 90 TABLET | Refills: 0 | Status: SHIPPED | OUTPATIENT
Start: 2021-11-01 | End: 2022-02-07

## 2022-01-05 DIAGNOSIS — E78.2 MIXED HYPERLIPIDEMIA: ICD-10-CM

## 2022-01-06 RX ORDER — ATORVASTATIN CALCIUM 40 MG/1
TABLET, FILM COATED ORAL
Qty: 90 TABLET | Refills: 0 | Status: SHIPPED | OUTPATIENT
Start: 2022-01-06 | End: 2022-02-25

## 2022-02-07 DIAGNOSIS — I10 ESSENTIAL HYPERTENSION: ICD-10-CM

## 2022-02-07 RX ORDER — BENAZEPRIL/HYDROCHLOROTHIAZIDE 20 MG-25MG
TABLET ORAL
Qty: 90 TABLET | Refills: 0 | Status: SHIPPED | OUTPATIENT
Start: 2022-02-07 | End: 2022-06-15 | Stop reason: SDUPTHER

## 2022-02-11 DIAGNOSIS — E11.65 TYPE 2 DIABETES MELLITUS WITH HYPERGLYCEMIA, WITHOUT LONG-TERM CURRENT USE OF INSULIN (HCC): Primary | ICD-10-CM

## 2022-02-25 DIAGNOSIS — E11.65 TYPE 2 DIABETES MELLITUS WITH HYPERGLYCEMIA, WITHOUT LONG-TERM CURRENT USE OF INSULIN (HCC): ICD-10-CM

## 2022-02-25 DIAGNOSIS — E78.2 MIXED HYPERLIPIDEMIA: ICD-10-CM

## 2022-02-25 RX ORDER — ATORVASTATIN CALCIUM 40 MG/1
TABLET, FILM COATED ORAL
Qty: 90 TABLET | Refills: 0 | Status: SHIPPED | OUTPATIENT
Start: 2022-02-25 | End: 2022-07-13 | Stop reason: SDUPTHER

## 2022-06-15 DIAGNOSIS — I10 ESSENTIAL HYPERTENSION: ICD-10-CM

## 2022-06-15 RX ORDER — BENAZEPRIL/HYDROCHLOROTHIAZIDE 20 MG-25MG
1 TABLET ORAL DAILY
Qty: 30 TABLET | Refills: 0 | Status: SHIPPED | OUTPATIENT
Start: 2022-06-15 | End: 2022-07-13 | Stop reason: SDUPTHER

## 2022-07-13 ENCOUNTER — OFFICE VISIT (OUTPATIENT)
Dept: FAMILY MEDICINE CLINIC | Facility: CLINIC | Age: 57
End: 2022-07-13
Payer: COMMERCIAL

## 2022-07-13 VITALS
HEART RATE: 76 BPM | RESPIRATION RATE: 18 BRPM | BODY MASS INDEX: 24.94 KG/M2 | TEMPERATURE: 97.1 F | WEIGHT: 127 LBS | SYSTOLIC BLOOD PRESSURE: 138 MMHG | DIASTOLIC BLOOD PRESSURE: 76 MMHG | HEIGHT: 60 IN | OXYGEN SATURATION: 98 %

## 2022-07-13 DIAGNOSIS — E11.65 TYPE 2 DIABETES MELLITUS WITH HYPERGLYCEMIA, WITHOUT LONG-TERM CURRENT USE OF INSULIN (HCC): Primary | ICD-10-CM

## 2022-07-13 DIAGNOSIS — Z23 ENCOUNTER FOR IMMUNIZATION: ICD-10-CM

## 2022-07-13 DIAGNOSIS — M06.9 RHEUMATOID ARTHRITIS, INVOLVING UNSPECIFIED SITE, UNSPECIFIED WHETHER RHEUMATOID FACTOR PRESENT (HCC): ICD-10-CM

## 2022-07-13 DIAGNOSIS — E78.2 MIXED HYPERLIPIDEMIA: ICD-10-CM

## 2022-07-13 DIAGNOSIS — Z12.31 ENCOUNTER FOR SCREENING MAMMOGRAM FOR BREAST CANCER: ICD-10-CM

## 2022-07-13 DIAGNOSIS — I10 ESSENTIAL HYPERTENSION: ICD-10-CM

## 2022-07-13 LAB — SL AMB POCT HEMOGLOBIN AIC: 6.7 (ref ?–6.5)

## 2022-07-13 PROCEDURE — 90677 PCV20 VACCINE IM: CPT | Performed by: FAMILY MEDICINE

## 2022-07-13 PROCEDURE — 90471 IMMUNIZATION ADMIN: CPT | Performed by: FAMILY MEDICINE

## 2022-07-13 PROCEDURE — 3075F SYST BP GE 130 - 139MM HG: CPT | Performed by: FAMILY MEDICINE

## 2022-07-13 PROCEDURE — 3078F DIAST BP <80 MM HG: CPT | Performed by: FAMILY MEDICINE

## 2022-07-13 PROCEDURE — 99214 OFFICE O/P EST MOD 30 MIN: CPT | Performed by: FAMILY MEDICINE

## 2022-07-13 PROCEDURE — 3725F SCREEN DEPRESSION PERFORMED: CPT | Performed by: FAMILY MEDICINE

## 2022-07-13 PROCEDURE — 83036 HEMOGLOBIN GLYCOSYLATED A1C: CPT | Performed by: FAMILY MEDICINE

## 2022-07-13 PROCEDURE — 3044F HG A1C LEVEL LT 7.0%: CPT | Performed by: FAMILY MEDICINE

## 2022-07-13 RX ORDER — ATORVASTATIN CALCIUM 40 MG/1
40 TABLET, FILM COATED ORAL DAILY
Qty: 90 TABLET | Refills: 0 | Status: SHIPPED | OUTPATIENT
Start: 2022-07-13

## 2022-07-13 RX ORDER — BENAZEPRIL/HYDROCHLOROTHIAZIDE 20 MG-25MG
1 TABLET ORAL DAILY
Qty: 30 TABLET | Refills: 0 | Status: SHIPPED | OUTPATIENT
Start: 2022-07-13 | End: 2022-07-14

## 2022-07-13 NOTE — PROGRESS NOTES
Subjective:      Patient ID: Juhi Siu is a 59 y o  female  Here follow up with son Aleksandra Niño,   Rheumatoid arthritis- diagnosed few years ago, sees Dr Darien Michaud and is on chronic methotrexate therapy, no recent flare ups or prednisone use  Type 2 diabetes mellitus-diagnosed likely prior to 2019,she has been compliant with metformin and tolerating it well, she has lost weight and has been compliant with diet but due to her son's wedding festivities had too many desserts and exercise, no recent a1c  Essential hypertension-compliant with meds, no side effects, her BP has been running normal at home  Overweight-resolved, patient successfully lost weight      Past Medical History:   Diagnosis Date    Diabetes mellitus ([facility] )     Rheumatoid arthritis ([facility] )     Rheumatoid arthritis ([facility] )        Family History   Problem Relation Age of Onset    Diabetes Father        History reviewed  No pertinent surgical history  reports that she has never smoked  She has never used smokeless tobacco  She reports previous alcohol use  Current Outpatient Medications:     atorvastatin (LIPITOR) 40 mg tablet, Take 1 tablet (40 mg total) by mouth daily, Disp: 90 tablet, Rfl: 0    benazepril-hydrochlorthiazide (LOTENSIN HCT) 20-25 MG per tablet, Take 1 tablet by mouth daily, Disp: 30 tablet, Rfl: 0    folic acid (FOLVITE) 1 mg tablet, Take by mouth daily, Disp: , Rfl:     metFORMIN (GLUCOPHAGE) 1000 MG tablet, Take 1 tablet (1,000 mg total) by mouth 2 (two) times a day with meals, Disp: 180 tablet, Rfl: 0    methotrexate 2 5 MG tablet, 3 tablet once a week, Disp: , Rfl:     The following portions of the patient's history were reviewed and updated as appropriate: allergies, current medications, past family history, past medical history, past social history, past surgical history and problem list     Review of Systems   Constitutional: Negative for fatigue and fever     HENT: Negative for congestion, facial swelling, mouth sores, rhinorrhea, sore throat and trouble swallowing  Eyes: Negative for pain and redness  Respiratory: Negative for cough, shortness of breath and wheezing  Cardiovascular: Negative for chest pain, palpitations and leg swelling  Gastrointestinal: Negative for abdominal pain, blood in stool, constipation, diarrhea and nausea  Genitourinary: Negative for dysuria, hematuria and urgency  Musculoskeletal: Negative for arthralgias, back pain and myalgias  Skin: Negative for rash and wound  Neurological: Negative for seizures, syncope and headaches  Hematological: Negative for adenopathy  Psychiatric/Behavioral: Negative for agitation and behavioral problems  PHQ-2/9 Depression Screening    Little interest or pleasure in doing things: 0 - not at all  Feeling down, depressed, or hopeless: 0 - not at all  PHQ-2 Score: 0  PHQ-2 Interpretation: Negative depression screen             Objective:    /76 (BP Location: Right arm, Patient Position: Sitting, Cuff Size: Adult)   Pulse 76   Temp (!) 97 1 °F (36 2 °C) (Temporal)   Resp 18   Ht 5' (1 524 m)   Wt 57 6 kg (127 lb)   SpO2 98%   BMI 24 80 kg/m²      Physical Exam  Vitals and nursing note reviewed  Constitutional:       Appearance: Normal appearance  She is well-developed  She is not ill-appearing  HENT:      Head: Normocephalic and atraumatic  Right Ear: Tympanic membrane, ear canal and external ear normal       Left Ear: Tympanic membrane, ear canal and external ear normal       Nose: Nose normal       Mouth/Throat:      Mouth: Mucous membranes are moist       Pharynx: No oropharyngeal exudate or posterior oropharyngeal erythema  Eyes:      General: No scleral icterus  Right eye: No discharge  Left eye: No discharge  Conjunctiva/sclera: Conjunctivae normal    Cardiovascular:      Rate and Rhythm: Normal rate        Pulses: no weak pulses          Dorsalis pedis pulses are 2+ on the right side and 2+ on the left side  Heart sounds: No murmur heard  No gallop  Pulmonary:      Effort: Pulmonary effort is normal  No respiratory distress  Breath sounds: Normal breath sounds  No stridor  No wheezing, rhonchi or rales  Abdominal:      Palpations: Abdomen is soft  Tenderness: There is no abdominal tenderness  Musculoskeletal:         General: No tenderness or deformity  Feet:      Right foot:      Skin integrity: No ulcer, skin breakdown, erythema, warmth, callus or dry skin  Left foot:      Skin integrity: No ulcer, skin breakdown, erythema, warmth, callus or dry skin  Skin:     Findings: No erythema or rash  Neurological:      Mental Status: She is alert  Mental status is at baseline  Psychiatric:         Behavior: Behavior normal          Judgment: Judgment normal            Patient's shoes and socks removed  Right Foot/Ankle   Right Foot Inspection  Skin Exam: skin normal  Skin not intact, no dry skin, no warmth, no callus, no erythema, no maceration, no abnormal color, no pre-ulcer, no ulcer and no callus  Toe Exam: ROM and strength within normal limits  Sensory   Monofilament testing: intact    Vascular  Capillary refills: < 3 seconds  The right DP pulse is 2+  Right Toe  - Comprehensive Exam  Ecchymosis: none  Swelling: none   Tenderness: none         Left Foot/Ankle  Left Foot Inspection  Skin Exam: skin normal  Skin not intact, no dry skin, no warmth, no erythema, no maceration, normal color, no pre-ulcer, no ulcer and no callus  Toe Exam: ROM and strength within normal limits  Sensory   Monofilament testing: intact    Vascular  Capillary refills: < 3 seconds  The left DP pulse is 2+       Left Toe  - Comprehensive Exam  Ecchymosis: none  Swelling: none   Tenderness: none           Assign Risk Category  No deformity present  No loss of protective sensation  No weak pulses  Risk: 0          Recent Results (from the past 8736 hour(s))   CBC and differential    Collection Time: 08/28/21  9:17 AM   Result Value Ref Range    WBC 7 11 4 31 - 10 16 Thousand/uL    RBC 4 65 3 81 - 5 12 Million/uL    Hemoglobin 13 2 11 5 - 15 4 g/dL    Hematocrit 38 4 34 8 - 46 1 %    MCV 83 82 - 98 fL    MCH 28 4 26 8 - 34 3 pg    MCHC 34 4 31 4 - 37 4 g/dL    RDW 13 8 11 6 - 15 1 %    MPV 9 7 8 9 - 12 7 fL    Platelets 720 518 - 911 Thousands/uL    nRBC 0 /100 WBCs    Neutrophils Relative 58 43 - 75 %    Immat GRANS % 0 0 - 2 %    Lymphocytes Relative 27 14 - 44 %    Monocytes Relative 9 4 - 12 %    Eosinophils Relative 5 0 - 6 %    Basophils Relative 1 0 - 1 %    Neutrophils Absolute 4 17 1 85 - 7 62 Thousands/µL    Immature Grans Absolute 0 03 0 00 - 0 20 Thousand/uL    Lymphocytes Absolute 1 90 0 60 - 4 47 Thousands/µL    Monocytes Absolute 0 61 0 17 - 1 22 Thousand/µL    Eosinophils Absolute 0 32 0 00 - 0 61 Thousand/µL    Basophils Absolute 0 08 0 00 - 0 10 Thousands/µL   C-reactive protein    Collection Time: 08/28/21  9:17 AM   Result Value Ref Range    CRP 6 3 (H) <3 0 mg/L   BUN    Collection Time: 08/28/21  9:17 AM   Result Value Ref Range    BUN 16 5 - 25 mg/dL   Hepatic function panel    Collection Time: 08/28/21  9:17 AM   Result Value Ref Range    Total Bilirubin 0 46 0 20 - 1 00 mg/dL    Bilirubin, Direct 0 13 0 00 - 0 20 mg/dL    Alkaline Phosphatase 89 46 - 116 U/L    AST 16 5 - 45 U/L    ALT 25 12 - 78 U/L    Total Protein 7 8 6 4 - 8 2 g/dL    Albumin 3 8 3 5 - 5 0 g/dL   Sedimentation rate, automated    Collection Time: 08/28/21  9:17 AM   Result Value Ref Range    Sed Rate 43 (H) 0 - 29 mm/hour   Creatinine, serum    Collection Time: 08/28/21  9:17 AM   Result Value Ref Range    Creatinine 0 96 0 60 - 1 30 mg/dL    eGFR 65 ml/min/1 73sq m   POCT hemoglobin A1c    Collection Time: 07/13/22  5:13 PM   Result Value Ref Range    Hemoglobin A1C 6 7 (A) 6 5       Laboratory Results: I have personally reviewed the pertinent laboratory results/reports Radiology/Other Diagnostic Testing Results: I have personally reviewed pertinent reports  Assessment/Plan:         Diagnoses and all orders for this visit:    Type 2 diabetes mellitus with hyperglycemia, without long-term current use of insulin (HCC)  -     POCT hemoglobin A1c  -     Microalbumin / creatinine urine ratio; Future  -     Hemoglobin A1C; Future  -     metFORMIN (GLUCOPHAGE) 1000 MG tablet; Take 1 tablet (1,000 mg total) by mouth 2 (two) times a day with meals    Rheumatoid arthritis, involving unspecified site, unspecified whether rheumatoid factor present ([facility] )    Encounter for immunization  -     Pneumococcal Conjugate Vaccine 20-valent (Pcv20)    Essential hypertension  -     benazepril-hydrochlorthiazide (LOTENSIN HCT) 20-25 MG per tablet; Take 1 tablet by mouth daily    Encounter for screening mammogram for breast cancer  -     Mammo screening bilateral w 3d & cad; Future    Mixed hyperlipidemia  -     atorvastatin (LIPITOR) 40 mg tablet; Take 1 tablet (40 mg total) by mouth daily      A1C-6 7, continue metformin-daily, advised to cut back all desserts  Continue benazepril-hctz  Discussed that due to age and risk factors patient is in need of mammogram to screen for breast cancer  Patient verbalized understanding and is willing  Order placed today for mammogram     PCV-20 today  Recommend annual diabetic eye exam  Normal diabetic foot exam  Wants to think about colonoscopy/cologuard    Read package inserts for all medications before starting a new medications, call me if you have any questions  Patient was given opportunity to ask questions and all questions were answered  Portions of the record may have been created with voice recognition software  Occasional wrong word or "sound a like" substitutions may have occurred due to the inherent limitations of voice recognition software  Read the chart carefully and recognize, using context, where substitutions have occurred

## 2023-03-31 DIAGNOSIS — E11.65 TYPE 2 DIABETES MELLITUS WITH HYPERGLYCEMIA, WITHOUT LONG-TERM CURRENT USE OF INSULIN (HCC): ICD-10-CM

## 2023-03-31 NOTE — TELEPHONE ENCOUNTER
30 day (1 month) supply in for Dr Kevin Sheridan to approve pt last seen July of last year she needs to make an appt with Dr Bill García for a follow up

## 2023-04-05 DIAGNOSIS — E11.65 TYPE 2 DIABETES MELLITUS WITH HYPERGLYCEMIA, WITHOUT LONG-TERM CURRENT USE OF INSULIN (HCC): ICD-10-CM

## 2023-08-22 DIAGNOSIS — I10 ESSENTIAL HYPERTENSION: ICD-10-CM

## 2023-08-23 RX ORDER — BENAZEPRIL/HYDROCHLOROTHIAZIDE 20 MG-25MG
TABLET ORAL
Qty: 7 TABLET | Refills: 0 | Status: SHIPPED | OUTPATIENT
Start: 2023-08-23 | End: 2023-08-29 | Stop reason: SDUPTHER

## 2023-08-29 ENCOUNTER — OFFICE VISIT (OUTPATIENT)
Dept: FAMILY MEDICINE CLINIC | Facility: CLINIC | Age: 58
End: 2023-08-29
Payer: COMMERCIAL

## 2023-08-29 VITALS
TEMPERATURE: 96.6 F | DIASTOLIC BLOOD PRESSURE: 90 MMHG | SYSTOLIC BLOOD PRESSURE: 142 MMHG | OXYGEN SATURATION: 98 % | HEIGHT: 60 IN | HEART RATE: 76 BPM | RESPIRATION RATE: 14 BRPM | BODY MASS INDEX: 25.32 KG/M2 | WEIGHT: 129 LBS

## 2023-08-29 DIAGNOSIS — I10 ESSENTIAL HYPERTENSION: ICD-10-CM

## 2023-08-29 DIAGNOSIS — Z12.4 ENCOUNTER FOR SCREENING FOR CERVICAL CANCER: ICD-10-CM

## 2023-08-29 DIAGNOSIS — Z13.1 SCREENING FOR DIABETES MELLITUS: ICD-10-CM

## 2023-08-29 DIAGNOSIS — Z00.01 ENCOUNTER FOR GENERAL ADULT MEDICAL EXAMINATION WITH ABNORMAL FINDINGS: Primary | ICD-10-CM

## 2023-08-29 DIAGNOSIS — Z12.11 COLON CANCER SCREENING: ICD-10-CM

## 2023-08-29 DIAGNOSIS — Z12.31 ENCOUNTER FOR SCREENING MAMMOGRAM FOR BREAST CANCER: ICD-10-CM

## 2023-08-29 DIAGNOSIS — E11.65 TYPE 2 DIABETES MELLITUS WITH HYPERGLYCEMIA, WITHOUT LONG-TERM CURRENT USE OF INSULIN (HCC): ICD-10-CM

## 2023-08-29 DIAGNOSIS — M06.9 RHEUMATOID ARTHRITIS, INVOLVING UNSPECIFIED SITE, UNSPECIFIED WHETHER RHEUMATOID FACTOR PRESENT (HCC): ICD-10-CM

## 2023-08-29 DIAGNOSIS — E78.2 MIXED HYPERLIPIDEMIA: ICD-10-CM

## 2023-08-29 PROCEDURE — 99396 PREV VISIT EST AGE 40-64: CPT | Performed by: FAMILY MEDICINE

## 2023-08-29 PROCEDURE — 99214 OFFICE O/P EST MOD 30 MIN: CPT | Performed by: FAMILY MEDICINE

## 2023-08-29 RX ORDER — BENAZEPRIL/HYDROCHLOROTHIAZIDE 20 MG-25MG
1 TABLET ORAL DAILY
Qty: 90 TABLET | Refills: 0 | Status: SHIPPED | OUTPATIENT
Start: 2023-08-29 | End: 2023-11-27

## 2023-08-29 RX ORDER — ATORVASTATIN CALCIUM 40 MG/1
40 TABLET, FILM COATED ORAL DAILY
Qty: 90 TABLET | Refills: 0 | Status: SHIPPED | OUTPATIENT
Start: 2023-08-29

## 2023-08-29 NOTE — PROGRESS NOTES
[facility]    NAME: Padma Avery  AGE: 59 y.o. SEX: female  : 1965     DATE: 2023     Assessment and Plan:     Problem List Items Addressed This Visit        Endocrine    Type 2 diabetes mellitus with hyperglycemia, without long-term current use of insulin (HCC)    Relevant Medications    metFORMIN (GLUCOPHAGE) 1000 MG tablet    Other Relevant Orders    Hemoglobin A1C    Albumin / creatinine urine ratio    Ambulatory Referral to Ophthalmology    TSH, 3rd generation with Free T4 reflex       Cardiovascular and Mediastinum    Essential hypertension    Relevant Medications    benazepril-hydrochlorthiazide (LOTENSIN HCT) 20-25 MG per tablet       Musculoskeletal and Integument    Rheumatoid arthritis, involving unspecified site, unspecified whether rheumatoid factor present ([address])       Other    Mixed hyperlipidemia    Relevant Medications    atorvastatin (LIPITOR) 40 mg tablet    Other Relevant Orders    Lipid panel   Other Visit Diagnoses     Encounter for general adult medical examination with abnormal findings    -  Primary    Relevant Orders    TSH, 3rd generation with Free T4 reflex    Colon cancer screening        Relevant Orders    Ambulatory Referral to Gastroenterology    Encounter for screening mammogram for breast cancer        Relevant Orders    Mammo screening bilateral w cad    Encounter for screening for cervical cancer        Relevant Orders    Ambulatory Referral to Gynecology    Screening for diabetes mellitus            Needs labs, mammogram and colonoscopy ordered as above  Discussed will need to follow up in 3months  Low sodium diet  Reviewed cbc, cmp from Dr Satinder Campos.  Son present during the visit, she prefers him to translate. Immunizations and preventive care screenings were discussed with patient today.  Appropriate education was printed on patient's after visit summary. Counseling:  Alcohol/drug use: discussed moderation in alcohol intake, the recommendations for healthy alcohol use, and avoidance of illicit drug use. Dental Health: discussed importance of regular tooth brushing, flossing, and dental visits. Injury prevention: discussed safety/seat belts, safety helmets, smoke detectors, carbon dioxide detectors, and smoking near bedding or upholstery. Sexual health: discussed sexually transmitted diseases, partner selection, use of condoms, avoidance of unintended pregnancy, and contraceptive alternatives. · Exercise: the importance of regular exercise/physical activity was discussed. Recommend exercise 3-5 times per week for at least 30 minutes. BMI Counseling: Body mass index is 25.19 kg/m². The BMI is above normal. Nutrition recommendations include decreasing portion sizes, encouraging healthy choices of fruits and vegetables, decreasing fast food intake, consuming healthier snacks, limiting drinks that contain sugar, moderation in carbohydrate intake and reducing intake of cholesterol. Exercise recommendations include moderate physical activity 150 minutes/week. No pharmacotherapy was ordered. Rationale for BMI follow-up plan is due to patient being overweight or obese. Depression Screening and Follow-up Plan: Patient was screened for depression during today's encounter. They screened negative with a PHQ-2 score of 0. No follow-ups on file. Chief Complaint:     Chief Complaint   Patient presents with   • Physical Exam      History of Present Illness:     Adult Annual Physical   Patient here for a comprehensive physical exam. The patient reports no problems. Diet and Physical Activity  · Diet/Nutrition: well balanced diet and consuming 3-5 servings of fruits/vegetables daily. · Exercise: moderate cardiovascular exercise.       Depression Screening  PHQ-2/9 Depression Screening    Little interest or pleasure in doing things: 0 - not at all  Feeling down, depressed, or hopeless: 0 - not at all  PHQ-2 Score: 0  PHQ-2 Interpretation: Negative depression screen       General Health  · Sleep: sleeps well. · Hearing: grossly normal.  · Vision: goes for regular eye exams. · Dental: regular dental visits. /GYN Health  · Patient is: postmenopausal  ·      Review of Systems:     Review of Systems   Constitutional: Negative for fatigue and fever. HENT: Negative for congestion, facial swelling, mouth sores, rhinorrhea, sore throat and trouble swallowing. Eyes: Negative for pain and redness. Respiratory: Negative for cough, shortness of breath and wheezing. Cardiovascular: Negative for chest pain, palpitations and leg swelling. Gastrointestinal: Negative for abdominal pain, blood in stool, constipation, diarrhea and nausea. Genitourinary: Negative for dysuria, hematuria and urgency. Musculoskeletal: Negative for arthralgias, back pain and myalgias. Skin: Negative for rash and wound. Neurological: Negative for seizures, syncope and headaches. Hematological: Negative for adenopathy. Psychiatric/Behavioral: Negative for agitation and behavioral problems. Past Medical History:     Past Medical History:   Diagnosis Date   • Diabetes mellitus ([facility])    • Rheumatoid arthritis ([facility])    • Rheumatoid arthritis ([facility])       Past Surgical History:     History reviewed. No pertinent surgical history.    Social History:     Social History     Socioeconomic History   • Marital status: Unknown     Spouse name: None   • Number of children: None   • Years of education: None   • Highest education level: None   Occupational History   • None   Tobacco Use   • Smoking status: Never   • Smokeless tobacco: Never   Vaping Use   • Vaping Use: Never used   Substance and Sexual Activity   • Alcohol use: Not Currently   • Drug use: None   • Sexual activity: None   Other Topics Concern   • None   Social History Narrative   • None     Social Determinants of Health     Financial Resource Strain: Not on file   Food Insecurity: Not on file   Transportation Needs: Not on file   Physical Activity: Not on file   Stress: Not on file   Social Connections: Not on file   Intimate Partner Violence: Not on file   Housing Stability: Not on file      Family History:     Family History   Problem Relation Age of Onset   • Diabetes Father       Current Medications:     Current Outpatient Medications   Medication Sig Dispense Refill   • atorvastatin (LIPITOR) 40 mg tablet Take 1 tablet (40 mg total) by mouth daily 90 tablet 0   • benazepril-hydrochlorthiazide (LOTENSIN HCT) 20-25 MG per tablet Take 1 tablet by mouth daily 90 tablet 0   • folic acid (FOLVITE) 1 mg tablet Take by mouth daily     • metFORMIN (GLUCOPHAGE) 1000 MG tablet Take 1 tablet (1,000 mg total) by mouth daily with breakfast 90 tablet 0   • methotrexate 2.5 MG tablet 3 tablet once a week       No current facility-administered medications for this visit. Allergies:     No Known Allergies   Physical Exam:     /90 (BP Location: Left arm, Patient Position: Sitting, Cuff Size: Adult)   Pulse 76   Temp (!) 96.6 °F (35.9 °C) (Tympanic)   Resp 14   Ht 5' (1.524 m)   Wt 58.5 kg (129 lb)   SpO2 98%   BMI 25.19 kg/m²     Physical Exam  Vitals and nursing note reviewed. Constitutional:       Appearance: She is well-developed. HENT:      Head: Normocephalic and atraumatic. Right Ear: Tympanic membrane, ear canal and external ear normal.      Left Ear: Tympanic membrane, ear canal and external ear normal.      Nose: Nose normal.      Mouth/Throat:      Pharynx: No oropharyngeal exudate. Eyes:      General: No scleral icterus. Right eye: No discharge. Left eye: No discharge. Conjunctiva/sclera: Conjunctivae normal.      Pupils: Pupils are equal, round, and reactive to light. Neck:      Thyroid: No thyromegaly.    Cardiovascular:      Rate and Rhythm: Normal rate and regular rhythm. Pulses: no weak pulses          Dorsalis pedis pulses are 2+ on the right side and 2+ on the left side. Heart sounds: No murmur heard. No gallop. Pulmonary:      Effort: Pulmonary effort is normal. No respiratory distress. Breath sounds: Normal breath sounds. No wheezing or rales. Abdominal:      Palpations: Abdomen is soft. Tenderness: There is no abdominal tenderness. Musculoskeletal:         General: No tenderness or deformity. Cervical back: Normal range of motion. Right lower leg: No edema. Left lower leg: No edema. Feet:      Right foot:      Skin integrity: No ulcer, skin breakdown, erythema, warmth, callus or dry skin. Left foot:      Skin integrity: No ulcer, skin breakdown, erythema, warmth, callus or dry skin. Lymphadenopathy:      Cervical: No cervical adenopathy. Skin:     General: Skin is warm. Capillary Refill: Capillary refill takes less than 2 seconds. Findings: No erythema or rash. Neurological:      Mental Status: She is alert and oriented to person, place, and time. Deep Tendon Reflexes: Reflexes normal.   Psychiatric:         Behavior: Behavior normal.         Thought Content: Thought content normal.         Judgment: Judgment normal.          Patient's shoes and socks removed. Right Foot/Ankle   Right Foot Inspection  Skin Exam: skin normal. Skin not intact, no dry skin, no warmth, no callus, no erythema, no maceration, no abnormal color, no pre-ulcer, no ulcer and no callus. Toe Exam: ROM and strength within normal limits. Sensory   Monofilament testing: intact    Vascular  Capillary refills: < 3 seconds  The right DP pulse is 2+.      Right Toe  - Comprehensive Exam  Ecchymosis: none  Swelling: none   Tenderness: none         Left Foot/Ankle  Left Foot Inspection  Skin Exam: skin normal. Skin not intact, no dry skin, no warmth, no erythema, no maceration, normal color, no pre-ulcer, no ulcer and no callus. Toe Exam: ROM and strength within normal limits. Sensory   Monofilament testing: intact    Vascular  Capillary refills: < 3 seconds  The left DP pulse is 2+.      Left Toe  - Comprehensive Exam  Ecchymosis: none  Swelling: none   Tenderness: none           Assign Risk Category  No deformity present  No loss of protective sensation  No weak pulses  Risk: 0    \    Olivia Amado MD  [id number]

## 2023-09-18 ENCOUNTER — OFFICE VISIT (OUTPATIENT)
Dept: OBGYN CLINIC | Facility: CLINIC | Age: 58
End: 2023-09-18
Payer: COMMERCIAL

## 2023-09-18 VITALS
HEIGHT: 60 IN | WEIGHT: 130 LBS | SYSTOLIC BLOOD PRESSURE: 162 MMHG | BODY MASS INDEX: 25.52 KG/M2 | DIASTOLIC BLOOD PRESSURE: 90 MMHG

## 2023-09-18 DIAGNOSIS — Z11.51 SCREENING FOR HPV (HUMAN PAPILLOMAVIRUS): ICD-10-CM

## 2023-09-18 DIAGNOSIS — Z01.419 ENCOUNTER FOR GYNECOLOGICAL EXAMINATION WITHOUT ABNORMAL FINDING: Primary | ICD-10-CM

## 2023-09-18 DIAGNOSIS — Z12.4 ENCOUNTER FOR SCREENING FOR CERVICAL CANCER: ICD-10-CM

## 2023-09-18 PROCEDURE — G0124 SCREEN C/V THIN LAYER BY MD: HCPCS | Performed by: STUDENT IN AN ORGANIZED HEALTH CARE EDUCATION/TRAINING PROGRAM

## 2023-09-18 PROCEDURE — S0612 ANNUAL GYNECOLOGICAL EXAMINA: HCPCS | Performed by: PHYSICIAN ASSISTANT

## 2023-09-18 PROCEDURE — G0476 HPV COMBO ASSAY CA SCREEN: HCPCS | Performed by: PHYSICIAN ASSISTANT

## 2023-09-18 PROCEDURE — G0145 SCR C/V CYTO,THINLAYER,RESCR: HCPCS | Performed by: STUDENT IN AN ORGANIZED HEALTH CARE EDUCATION/TRAINING PROGRAM

## 2023-09-19 LAB
HPV HR 12 DNA CVX QL NAA+PROBE: POSITIVE
HPV16 DNA CVX QL NAA+PROBE: NEGATIVE
HPV18 DNA CVX QL NAA+PROBE: NEGATIVE

## 2023-09-21 ENCOUNTER — TELEPHONE (OUTPATIENT)
Dept: OBGYN CLINIC | Facility: CLINIC | Age: 58
End: 2023-09-21

## 2023-09-21 LAB
LAB AP GYN PRIMARY INTERPRETATION: ABNORMAL
Lab: ABNORMAL
PATH INTERP SPEC-IMP: ABNORMAL

## 2023-09-21 NOTE — TELEPHONE ENCOUNTER
LVM for patient to return call. Informed patient we received pap smear results.  Would like pt to bring son in the office with her to translate results as per Dyllan Durbin PA-C.

## 2023-09-22 ENCOUNTER — TELEPHONE (OUTPATIENT)
Dept: OBGYN CLINIC | Facility: CLINIC | Age: 58
End: 2023-09-22

## 2023-09-22 NOTE — TELEPHONE ENCOUNTER
Left patient vm to set up appointment with Meena Wiley to discuss results. Complex Repair And Melolabial Flap Text: The defect edges were debeveled with a #15 scalpel blade.  The primary defect was closed partially with a complex linear closure.  Given the location of the remaining defect, shape of the defect and the proximity to free margins a melolabial flap was deemed most appropriate for complete closure of the defect.  Using a sterile surgical marker, an appropriate advancement flap was drawn incorporating the defect and placing the expected incisions within the relaxed skin tension lines where possible.    The area thus outlined was incised deep to adipose tissue with a #15 scalpel blade.  The skin margins were undermined to an appropriate distance in all directions utilizing iris scissors.

## 2023-09-27 ENCOUNTER — CONSULT (OUTPATIENT)
Dept: OBGYN CLINIC | Facility: CLINIC | Age: 58
End: 2023-09-27

## 2023-09-27 VITALS
HEIGHT: 60 IN | WEIGHT: 129 LBS | DIASTOLIC BLOOD PRESSURE: 70 MMHG | BODY MASS INDEX: 25.32 KG/M2 | SYSTOLIC BLOOD PRESSURE: 126 MMHG

## 2023-09-27 DIAGNOSIS — R87.611 ATYPICAL SQUAMOUS CELLS CANNOT EXCLUDE HIGH GRADE SQUAMOUS INTRAEPITHELIAL LESION ON CYTOLOGIC SMEAR OF CERVIX (ASC-H): ICD-10-CM

## 2023-09-27 DIAGNOSIS — B97.7 HIGH RISK HPV INFECTION: Primary | ICD-10-CM

## 2023-09-27 NOTE — PROGRESS NOTES
Assessment/Plan:    No problem-specific Assessment & Plan notes found for this encounter. Diagnoses and all orders for this visit:    High risk HPV infection    Atypical squamous cells cannot exclude high grade squamous intraepithelial lesion on cytologic smear of cervix (ASC-H)        Pap result was ASCUS cannot exclude HSIL with positive other HR HPV. Explained diagnosis to patient and her son. It is possible HPV is active currently secondary to patient being on methotrexate. HPV has likely been there for some time; no way to tell when infection was acquired. No medication to be given to clear the virus. Needs colposcopy follow up. Procedure explained. She will schedule f/u today. All questions answered to their satisfaction. F/u for colposcopy. Call with problems in the interim. Subjective:      Patient ID: Tricia Bowser is a 59 y.o. female. Patient is here to discuss Pap results. Seen with her son present. No changes since her visit on 9/18/23. The following portions of the patient's history were reviewed and updated as appropriate: allergies, current medications, past family history, past medical history, past social history, past surgical history and problem list.    Review of Systems   Genitourinary: Negative. Objective:      /70 (BP Location: Left arm, Patient Position: Sitting, Cuff Size: Adult)   Ht 5' (1.524 m)   Wt 58.5 kg (129 lb)   BMI 25.19 kg/m²          Physical Exam  Vitals reviewed. Constitutional:       Appearance: Normal appearance. She is well-developed and normal weight. Neurological:      Mental Status: She is alert and oriented to person, place, and time. Psychiatric:         Mood and Affect: Mood normal.         Behavior: Behavior normal. Behavior is cooperative. Thought Content:  Thought content normal.         Judgment: Judgment normal.

## 2023-10-11 ENCOUNTER — PROCEDURE VISIT (OUTPATIENT)
Dept: OBGYN CLINIC | Facility: CLINIC | Age: 58
End: 2023-10-11
Payer: COMMERCIAL

## 2023-10-11 VITALS
BODY MASS INDEX: 25.32 KG/M2 | DIASTOLIC BLOOD PRESSURE: 84 MMHG | WEIGHT: 129 LBS | SYSTOLIC BLOOD PRESSURE: 124 MMHG | HEIGHT: 60 IN

## 2023-10-11 DIAGNOSIS — R87.611 ATYPICAL SQUAMOUS CELLS CANNOT EXCLUDE HIGH GRADE SQUAMOUS INTRAEPITHELIAL LESION ON CYTOLOGIC SMEAR OF CERVIX (ASC-H): Primary | ICD-10-CM

## 2023-10-11 PROCEDURE — 57454 BX/CURETT OF CERVIX W/SCOPE: CPT | Performed by: OBSTETRICS & GYNECOLOGY

## 2023-10-11 PROCEDURE — 88344 IMHCHEM/IMCYTCHM EA MLT ANTB: CPT | Performed by: STUDENT IN AN ORGANIZED HEALTH CARE EDUCATION/TRAINING PROGRAM

## 2023-10-11 PROCEDURE — 88305 TISSUE EXAM BY PATHOLOGIST: CPT | Performed by: STUDENT IN AN ORGANIZED HEALTH CARE EDUCATION/TRAINING PROGRAM

## 2023-10-12 NOTE — PROGRESS NOTES
Assessment/Plan:     Diagnoses and all orders for this visit:    Atypical squamous cells cannot exclude high grade squamous intraepithelial lesion on cytologic smear of cervix (ASC-H)  -     Tissue Exam        23-year-old female  Atypical squamous cells cannot rule out high-grade/HPV positive  Plan  Colposcopy ECC and biopsy and 3/6/8/12  Subjective:      Patient ID: Kasandra Vila is a 59 y.o. female. HPI  Patient seen evaluated office today for colposcopy secondary to abnormal Pap smear atypical squamous cells cannot rule out high-grade and HPV positive procedure explained and discussed with patient    The following portions of the patient's history were reviewed and updated as appropriate: allergies, current medications, past family history, past medical history, past social history, past surgical history and problem list.    Review of Systems      Objective:      /84 (BP Location: Left arm, Patient Position: Sitting, Cuff Size: Adult)   Ht 5' (1.524 m)   Wt 58.5 kg (129 lb)   BMI 25.19 kg/m²          Physical Exam       Colposcopy     Date/Time  10/11/2023 5:15 PM     Universal Protocol   Consent: Verbal consent obtained. Risks and benefits: risks, benefits and alternatives were discussed  Consent given by: patient  Time out: Immediately prior to procedure a "time out" was called to verify the correct patient, procedure, equipment, support staff and site/side marked as required.   Patient understanding: patient states understanding of the procedure being performed  Patient consent: the patient's understanding of the procedure matches consent given  Patient identity confirmed: verbally with patient     Performed by  Iza Mcneil MD   Authorized by  Iza Mcneil MD     Pre-procedure details      Pre-procedure timeout performed: yes      Prepped with: acetic acid     Indication    ASC-H   Procedure Details   Procedure: Colposcopy w/ cervical biopsy and ECC      Under satisfactory analgesia the patient was prepped and draped in the dorsal lithotomy position: yes      [place] speculum was placed in the vagina: yes      Endocervix was curetted using a Kevorkian curette: yes      Cervical biopsy performed with a cervical biopsy punch: yes      Monsel's solution was applied: yes      Biopsy(s): yes      Location:  Biopsy 3/6/8/12    Specimen to pathology: yes     Post-procedure      Findings: Punctation and White epithelium      Patient tolerance of procedure:   Tolerated well, no immediate complications

## 2023-10-18 PROCEDURE — 88305 TISSUE EXAM BY PATHOLOGIST: CPT | Performed by: STUDENT IN AN ORGANIZED HEALTH CARE EDUCATION/TRAINING PROGRAM

## 2023-10-18 PROCEDURE — 88344 IMHCHEM/IMCYTCHM EA MLT ANTB: CPT | Performed by: STUDENT IN AN ORGANIZED HEALTH CARE EDUCATION/TRAINING PROGRAM

## 2023-11-02 ENCOUNTER — OFFICE VISIT (OUTPATIENT)
Dept: OBGYN CLINIC | Facility: CLINIC | Age: 58
End: 2023-11-02

## 2023-11-02 VITALS
SYSTOLIC BLOOD PRESSURE: 124 MMHG | HEIGHT: 60 IN | BODY MASS INDEX: 25.72 KG/M2 | DIASTOLIC BLOOD PRESSURE: 86 MMHG | WEIGHT: 131 LBS

## 2023-11-02 DIAGNOSIS — D06.9 CERVICAL INTRAEPITHELIAL NEOPLASIA GRADE 3: Primary | ICD-10-CM

## 2023-11-03 NOTE — PROGRESS NOTES
Assessment/Plan:     Diagnoses and all orders for this visit:    Cervical intraepithelial neoplasia grade 1    78-year-old female  Pap smear atypical squamous cells cannot rule out high-grade/HPV positive  Colposcopy ROS-3 on ECC/6/8/12  Plan  We will proceed with LEEP cone biopsy of the cervix. Discussed with patient in details all patient questions answered and patient was satisfied    Subjective:      Patient ID: Kasandra Vila is a 59 y.o. female. HPI  Patient seen and evaluated presented to the office today with her son to discuss colposcopy results and management option  Colposcopy results reviewed and discussed with patient in details    Final Diagnosis   A. CERVIX, ENDOCERVICAL CURETTAGE (ECC):    - High-grade squamous intraepithelial lesion (HSIL; RSO 3); see note. - Scant endocervical cells, too few to evaluate. - Negative for invasive carcinoma. B. CERVIX, 3:00, BIOPSY:    - Atrophic squamous mucosa with transitional cell metaplasia; see note. - No significant endocervical glands (transformation zone) present.    - Negative for dysplasia or malignancy. C. CERVIX, 6:00, BIOPSY:    - High-grade squamous intraepithelial lesion (HSIL; ROS 3), involving endocervical glands; see note. - Negative for invasive carcinoma. D. CERVIX, 8:00, BIOPSY:    - High-grade squamous intraepithelial lesion (HSIL; ROS 2-3); see note. - No significant endocervical glands (transformation zone) present.    - Negative for invasive carcinoma. E. CERVIX, 12:00, BIOPSY:    - Detached fragment of high-grade squamous intraepithelial lesion (HSIL; ROS 2-3); see note. - No endocervical glands (transformation zone) present.    - Negative for invasive carcinoma.    Based on the colposcopy results I would recommend to proceed with LEEP cone biopsy of the cervix procedure explained and discussed with patient and her son risk of bleeding, infection, blood transfusion, risk of bladder and rectal injury unable to possible, risk of anesthesia, risk of needing another surgery margin of the specimen came back positive  Also follow-up after surgery with repeat Pap smear in 6 months reviewed and discussed with patient and her son    All patient questions answered in details and patient was satisfied      the following portions of the patient's history were reviewed and updated as appropriate: allergies, current medications, past family history, past medical history, past social history, past surgical history and problem list.    Review of Systems      Objective:      /86 (BP Location: Left arm, Patient Position: Sitting, Cuff Size: Adult)   Ht 5' (1.524 m)   Wt 59.4 kg (131 lb)   BMI 25.58 kg/m²          Physical Exam  Constitutional:       Appearance: She is well-developed. Cardiovascular:      Rate and Rhythm: Normal rate and regular rhythm. Heart sounds: Normal heart sounds. Pulmonary:      Effort: Pulmonary effort is normal.      Breath sounds: Normal breath sounds. Abdominal:      General: There is no distension. Palpations: Abdomen is soft. Tenderness: There is no abdominal tenderness. Musculoskeletal:      Right lower leg: No edema. Left lower leg: No edema. Neurological:      Mental Status: She is alert and oriented to person, place, and time.    Psychiatric:         Behavior: Behavior normal.

## 2023-11-04 ENCOUNTER — VBI (OUTPATIENT)
Dept: ADMINISTRATIVE | Facility: OTHER | Age: 58
End: 2023-11-04

## 2024-01-04 DIAGNOSIS — E78.2 MIXED HYPERLIPIDEMIA: ICD-10-CM

## 2024-01-04 DIAGNOSIS — I10 ESSENTIAL HYPERTENSION: ICD-10-CM

## 2024-01-04 DIAGNOSIS — E11.65 TYPE 2 DIABETES MELLITUS WITH HYPERGLYCEMIA, WITHOUT LONG-TERM CURRENT USE OF INSULIN (HCC): ICD-10-CM

## 2024-01-05 RX ORDER — ATORVASTATIN CALCIUM 40 MG/1
40 TABLET, FILM COATED ORAL DAILY
Qty: 90 TABLET | Refills: 0 | Status: SHIPPED | OUTPATIENT
Start: 2024-01-05

## 2024-01-05 RX ORDER — BENAZEPRIL/HYDROCHLOROTHIAZIDE 20 MG-25MG
1 TABLET ORAL DAILY
Qty: 90 TABLET | Refills: 0 | Status: SHIPPED | OUTPATIENT
Start: 2024-01-05

## 2024-01-23 ENCOUNTER — TELEPHONE (OUTPATIENT)
Dept: OBGYN CLINIC | Facility: CLINIC | Age: 59
End: 2024-01-23

## 2024-01-23 ENCOUNTER — TELEPHONE (OUTPATIENT)
Age: 59
End: 2024-01-23

## 2024-01-23 NOTE — TELEPHONE ENCOUNTER
Called pts insurance to see if pt needs prior auth for OUT PT surgery on 02/12/24   Using code [id number] ([facility])    PA REQUIRED and started over the phone today, 01/23/24.   Clinicals also faxed to # [phone] for review    PENDING AUTH # [id number]  PA CURRENTLY PENDING

## 2024-01-23 NOTE — TELEPHONE ENCOUNTER
There was not A1c, she needs that, yes fasting glucose is mildly elevated.   She needs a follow up, A1c can be done in office.  She can watch her carbs till seen.

## 2024-01-23 NOTE — TELEPHONE ENCOUNTER
Lan is calling in regards to Trey's lab results. He states that he got the results back and her sugar levels are high.   He is requesting a call back to discuss lab results.     Offered to schedule appointment, did not schedule.     Please advise, thank you!

## 2024-01-31 ENCOUNTER — HOSPITAL ENCOUNTER (OUTPATIENT)
Dept: RADIOLOGY | Facility: HOSPITAL | Age: 59
Discharge: HOME | End: 2024-01-31
Payer: COMMERCIAL

## 2024-01-31 VITALS — BODY MASS INDEX: 25.72 KG/M2 | WEIGHT: 131 LBS | HEIGHT: 60 IN

## 2024-01-31 DIAGNOSIS — Z12.31 ENCOUNTER FOR SCREENING MAMMOGRAM FOR BREAST CANCER: ICD-10-CM

## 2024-01-31 PROCEDURE — 76642 ULTRASOUND BREAST LIMITED: CPT

## 2024-01-31 PROCEDURE — 77066 DX MAMMO INCL CAD BI: CPT

## 2024-01-31 PROCEDURE — G0279 TOMOSYNTHESIS, MAMMO: HCPCS

## 2024-02-02 NOTE — PRE-PROCEDURE INSTRUCTIONS
Pre-Surgery Instructions:   Medication Instructions    atorvastatin (LIPITOR) 40 mg tablet Take night before surgery    benazepril-hydrochlorthiazide (LOTENSIN HCT) 20-25 MG per tablet Hold day of surgery.    folic acid (FOLVITE) 1 mg tablet Hold day of surgery.    metFORMIN (GLUCOPHAGE) 1000 MG tablet Hold day of surgery.    methotrexate 2.5 MG tablet Take Friday's   Medication instructions for day surgery reviewed. Please use only a sip of water to take your instructed medications. Avoid all over the counter vitamins, supplements and NSAIDS for one week prior to surgery per anesthesia guidelines. Tylenol is ok to take as needed.     You will receive a call one business day prior to surgery with an arrival time and hospital directions. If your surgery is scheduled on a Monday, the hospital will be calling you on the Friday prior to your surgery. If you have not heard from anyone by 8pm, please call the hospital supervisor through the hospital  at [phone]. ([place] [phone]).    Do not eat or drink anything after midnight the night before your surgery, including candy, mints, lifesavers, or chewing gum. Do not drink alcohol 24hrs before your surgery. Try not to smoke at least 24hrs before your surgery.       Follow the pre surgery showering instructions as listed in the “My Surgical Experience Booklet” or otherwise provided by your surgeon's office. Do not use a blade to shave the surgical area 1 week before surgery. It is okay to use a clean electric clippers up to 24 hours before surgery. Do not apply any lotions, creams, including makeup, cologne, deodorant, or perfumes after showering on the day of your surgery. Do not use dry shampoo, hair spray, hair gel, or any type of hair products.     No contact lenses, eye make-up, or artificial eyelashes. Remove nail polish, including gel polish, and any artificial, gel, or acrylic nails if possible. Remove all jewelry including rings and body piercing  jewelry.     Wear causal clothing that is easy to take on and off. Consider your type of surgery.    Keep any valuables, jewelry, piercings at home. Please bring any specially ordered equipment (sling, braces) if indicated.    Arrange for a responsible person to drive you to and from the hospital on the day of your surgery. Visitor Guidelines discussed.     Call the surgeon's office with any new illnesses, exposures, or additional questions prior to surgery.    Please reference your “My Surgical Experience Booklet” for additional information to prepare for your upcoming surgery.

## 2024-02-04 ENCOUNTER — ANESTHESIA EVENT (OUTPATIENT)
Dept: PERIOP | Facility: HOSPITAL | Age: 59
End: 2024-02-04
Payer: COMMERCIAL

## 2024-02-12 ENCOUNTER — HOSPITAL ENCOUNTER (OUTPATIENT)
Facility: HOSPITAL | Age: 59
Setting detail: OUTPATIENT SURGERY
Discharge: HOME | End: 2024-02-12
Attending: OBSTETRICS & GYNECOLOGY | Admitting: OBSTETRICS & GYNECOLOGY
Payer: COMMERCIAL

## 2024-02-12 ENCOUNTER — ANESTHESIA (OUTPATIENT)
Dept: PERIOP | Facility: HOSPITAL | Age: 59
End: 2024-02-12
Payer: COMMERCIAL

## 2024-02-12 VITALS
OXYGEN SATURATION: 97 % | HEART RATE: 70 BPM | BODY MASS INDEX: 25.39 KG/M2 | TEMPERATURE: 97.8 F | RESPIRATION RATE: 16 BRPM | SYSTOLIC BLOOD PRESSURE: 181 MMHG | WEIGHT: 129.3 LBS | HEIGHT: 60 IN | DIASTOLIC BLOOD PRESSURE: 77 MMHG

## 2024-02-12 DIAGNOSIS — N87.1 DYSPLASIA OF CERVIX, HIGH GRADE CIN 2: ICD-10-CM

## 2024-02-12 LAB
GLUCOSE SERPL-MCNC: 154 MG/DL (ref 65–140)
GLUCOSE SERPL-MCNC: 158 MG/DL (ref 65–140)

## 2024-02-12 PROCEDURE — 88307 TISSUE EXAM BY PATHOLOGIST: CPT | Performed by: STUDENT IN AN ORGANIZED HEALTH CARE EDUCATION/TRAINING PROGRAM

## 2024-02-12 PROCEDURE — 88344 IMHCHEM/IMCYTCHM EA MLT ANTB: CPT | Performed by: STUDENT IN AN ORGANIZED HEALTH CARE EDUCATION/TRAINING PROGRAM

## 2024-02-12 PROCEDURE — 88305 TISSUE EXAM BY PATHOLOGIST: CPT | Performed by: STUDENT IN AN ORGANIZED HEALTH CARE EDUCATION/TRAINING PROGRAM

## 2024-02-12 PROCEDURE — NC001 PR NO CHARGE: Performed by: OBSTETRICS & GYNECOLOGY

## 2024-02-12 PROCEDURE — 82948 REAGENT STRIP/BLOOD GLUCOSE: CPT

## 2024-02-12 PROCEDURE — 57460 BX OF CERVIX W/SCOPE LEEP: CPT | Performed by: OBSTETRICS & GYNECOLOGY

## 2024-02-12 RX ORDER — LIDOCAINE HYDROCHLORIDE AND EPINEPHRINE 10; 10 MG/ML; UG/ML
INJECTION, SOLUTION INFILTRATION; PERINEURAL AS NEEDED
Status: DISCONTINUED | OUTPATIENT
Start: 2024-02-12 | End: 2024-02-12 | Stop reason: HOSPADM

## 2024-02-12 RX ORDER — MIDAZOLAM HYDROCHLORIDE 2 MG/2ML
INJECTION, SOLUTION INTRAMUSCULAR; INTRAVENOUS AS NEEDED
Status: DISCONTINUED | OUTPATIENT
Start: 2024-02-12 | End: 2024-02-12

## 2024-02-12 RX ORDER — OXYCODONE HYDROCHLORIDE AND ACETAMINOPHEN 5; 325 MG/1; MG/1
1 TABLET ORAL ONCE AS NEEDED
Status: DISCONTINUED | OUTPATIENT
Start: 2024-02-12 | End: 2024-02-12 | Stop reason: HOSPADM

## 2024-02-12 RX ORDER — IODINE SOLUTION STRONG 5% (LUGOL'S) 5 %
SOLUTION ORAL AS NEEDED
Status: DISCONTINUED | OUTPATIENT
Start: 2024-02-12 | End: 2024-02-12 | Stop reason: HOSPADM

## 2024-02-12 RX ORDER — PROPOFOL 10 MG/ML
INJECTION, EMULSION INTRAVENOUS AS NEEDED
Status: DISCONTINUED | OUTPATIENT
Start: 2024-02-12 | End: 2024-02-12

## 2024-02-12 RX ORDER — PROPOFOL 10 MG/ML
INJECTION, EMULSION INTRAVENOUS CONTINUOUS PRN
Status: DISCONTINUED | OUTPATIENT
Start: 2024-02-12 | End: 2024-02-12

## 2024-02-12 RX ORDER — FENTANYL CITRATE 50 UG/ML
INJECTION, SOLUTION INTRAMUSCULAR; INTRAVENOUS AS NEEDED
Status: DISCONTINUED | OUTPATIENT
Start: 2024-02-12 | End: 2024-02-12

## 2024-02-12 RX ORDER — LIDOCAINE HYDROCHLORIDE 10 MG/ML
INJECTION, SOLUTION EPIDURAL; INFILTRATION; INTRACAUDAL; PERINEURAL AS NEEDED
Status: DISCONTINUED | OUTPATIENT
Start: 2024-02-12 | End: 2024-02-12

## 2024-02-12 RX ORDER — ONDANSETRON 2 MG/ML
INJECTION INTRAMUSCULAR; INTRAVENOUS AS NEEDED
Status: DISCONTINUED | OUTPATIENT
Start: 2024-02-12 | End: 2024-02-12

## 2024-02-12 RX ORDER — KETOROLAC TROMETHAMINE 30 MG/ML
15 INJECTION, SOLUTION INTRAMUSCULAR; INTRAVENOUS ONCE AS NEEDED
Status: COMPLETED | OUTPATIENT
Start: 2024-02-12 | End: 2024-02-12

## 2024-02-12 RX ORDER — ONDANSETRON 2 MG/ML
4 INJECTION INTRAMUSCULAR; INTRAVENOUS ONCE AS NEEDED
Status: DISCONTINUED | OUTPATIENT
Start: 2024-02-12 | End: 2024-02-12 | Stop reason: HOSPADM

## 2024-02-12 RX ORDER — SODIUM CHLORIDE, SODIUM LACTATE, POTASSIUM CHLORIDE, CALCIUM CHLORIDE 600; 310; 30; 20 MG/100ML; MG/100ML; MG/100ML; MG/100ML
INJECTION, SOLUTION INTRAVENOUS CONTINUOUS PRN
Status: DISCONTINUED | OUTPATIENT
Start: 2024-02-12 | End: 2024-02-12

## 2024-02-12 RX ORDER — FENTANYL CITRATE/PF 50 MCG/ML
25 SYRINGE (ML) INJECTION
Status: DISCONTINUED | OUTPATIENT
Start: 2024-02-12 | End: 2024-02-12 | Stop reason: HOSPADM

## 2024-02-12 RX ADMIN — SODIUM CHLORIDE, SODIUM LACTATE, POTASSIUM CHLORIDE, AND CALCIUM CHLORIDE: .6; .31; .03; .02 INJECTION, SOLUTION INTRAVENOUS at 09:29

## 2024-02-12 RX ADMIN — FENTANYL CITRATE 25 MCG: 50 INJECTION, SOLUTION INTRAMUSCULAR; INTRAVENOUS at 09:57

## 2024-02-12 RX ADMIN — MIDAZOLAM HYDROCHLORIDE 2 MG: 1 INJECTION, SOLUTION INTRAMUSCULAR; INTRAVENOUS at 09:31

## 2024-02-12 RX ADMIN — LIDOCAINE HYDROCHLORIDE 50 MG: 10 INJECTION, SOLUTION EPIDURAL; INFILTRATION; INTRACAUDAL at 09:32

## 2024-02-12 RX ADMIN — FENTANYL CITRATE 25 MCG: 50 INJECTION, SOLUTION INTRAMUSCULAR; INTRAVENOUS at 09:47

## 2024-02-12 RX ADMIN — FENTANYL CITRATE 25 MCG: 50 INJECTION, SOLUTION INTRAMUSCULAR; INTRAVENOUS at 09:32

## 2024-02-12 RX ADMIN — PROPOFOL 30 MG: 10 INJECTION, EMULSION INTRAVENOUS at 09:55

## 2024-02-12 RX ADMIN — ONDANSETRON 4 MG: 2 INJECTION INTRAMUSCULAR; INTRAVENOUS at 09:32

## 2024-02-12 RX ADMIN — PROPOFOL 100 MCG/KG/MIN: 10 INJECTION, EMULSION INTRAVENOUS at 09:32

## 2024-02-12 RX ADMIN — KETOROLAC TROMETHAMINE 15 MG: 30 INJECTION, SOLUTION INTRAMUSCULAR; INTRAVENOUS at 10:25

## 2024-02-12 RX ADMIN — FENTANYL CITRATE 25 MCG: 50 INJECTION, SOLUTION INTRAMUSCULAR; INTRAVENOUS at 09:40

## 2024-02-12 NOTE — INTERVAL H&P NOTE
H&P reviewed. After examining the patient I find no changes in the patients condition since the H&P had been written.    Vitals:    02/12/24 0848   BP: (!) 176/89   Pulse: 87   Resp: 16   Temp: 97.5 °F (36.4 °C)   SpO2: 97%

## 2024-02-12 NOTE — H&P
58-year-old female  Pap smear atypical squamous cells cannot rule out high-grade/HPV positive  Colposcopy ROS-3 on ECC/6/8/12  Plan  We will proceed with LEEP cone biopsy of the cervix.  Discussed with patient in details all patient questions answered and patient was satisfied     Subjective:       Patient ID: Trey Baeza is a 58 y.o. female.     HPI  Patient seen and evaluated presented to the office today with her son to discuss colposcopy results and management option  Colposcopy results reviewed and discussed with patient in details     Final Diagnosis   A. CERVIX, ENDOCERVICAL CURETTAGE (ECC):    - High-grade squamous intraepithelial lesion (HSIL; ROS 3); see note.    - Scant endocervical cells, too few to evaluate.    - Negative for invasive carcinoma.     B. CERVIX, 3:00, BIOPSY:    - Atrophic squamous mucosa with transitional cell metaplasia; see note.    - No significant endocervical glands (transformation zone) present.    - Negative for dysplasia or malignancy.       C. CERVIX, 6:00, BIOPSY:    - High-grade squamous intraepithelial lesion (HSIL; ROS 3), involving endocervical glands; see note.    - Negative for invasive carcinoma.     D. CERVIX, 8:00, BIOPSY:    - High-grade squamous intraepithelial lesion (HSIL; ROS 2-3); see note.    - No significant endocervical glands (transformation zone) present.    - Negative for invasive carcinoma.     E. CERVIX, 12:00, BIOPSY:    - Detached fragment of high-grade squamous intraepithelial lesion (HSIL; ROS 2-3); see note.    - No endocervical glands (transformation zone) present.    - Negative for invasive carcinoma.   Based on the colposcopy results I would recommend to proceed with LEEP cone biopsy of the cervix procedure explained and discussed with patient and her son risk of bleeding, infection, blood transfusion, risk of bladder and rectal injury unable to possible, risk of anesthesia, risk of needing another surgery margin of the specimen came back  positive  Also follow-up after surgery with repeat Pap smear in 6 months reviewed and discussed with patient and her son     All patient questions answered in details and patient was satisfied        the following portions of the patient's history were reviewed and updated as appropriate: allergies, current medications, past family history, past medical history, past social history, past surgical history and problem list.     Review of Systems       Objective:        /86 (BP Location: Left arm, Patient Position: Sitting, Cuff Size: Adult)   Ht 5' (1.524 m)   Wt 59.4 kg (131 lb)   BMI 25.58 kg/m²             Physical Exam  Constitutional:       Appearance: She is well-developed.   Cardiovascular:      Rate and Rhythm: Normal rate and regular rhythm.      Heart sounds: Normal heart sounds.   Pulmonary:      Effort: Pulmonary effort is normal.      Breath sounds: Normal breath sounds.   Abdominal:      General: There is no distension.      Palpations: Abdomen is soft.      Tenderness: There is no abdominal tenderness.   Musculoskeletal:      Right lower leg: No edema.      Left lower leg: No edema.   Neurological:      Mental Status: She is alert and oriented to person, place, and time.   Psychiatric:         Behavior: Behavior normal.

## 2024-02-12 NOTE — ANESTHESIA POSTPROCEDURE EVALUATION
Post-Op Assessment Note    CV Status:  Stable    Pain management: adequate       Mental Status:  Alert   Hydration Status:  Stable   Airway Patency:  Patent and adequate     Post Op Vitals Reviewed: Yes    No anethesia notable event occurred.    Staff: CRNA               BP   130/62   Temp 98.0 F   Pulse 72   Resp 15   SpO2 97% on RA

## 2024-02-12 NOTE — ANESTHESIA PREPROCEDURE EVALUATION
Procedure:  BIOPSY LEEP CERVIX (Cervix)    Relevant Problems   CARDIO   (+) Essential hypertension   (+) Mixed hyperlipidemia      ENDO   (+) Type 2 diabetes mellitus with hyperglycemia, without long-term current use of insulin (HCC)      MUSCULOSKELETAL   (+) Rheumatoid arthritis, involving unspecified site, unspecified whether rheumatoid factor present (HCC)        Physical Exam    Airway    Mallampati score: II  TM Distance: >3 FB  Neck ROM: full     Dental   No notable dental hx     Cardiovascular  Cardiovascular exam normal    Pulmonary  Pulmonary exam normal     Other Findings  post-pubertal.      Anesthesia Plan  ASA Score- 2     Anesthesia Type- IV sedation with anesthesia with ASA Monitors.         Additional Monitors:     Airway Plan:            Plan Factors-Exercise tolerance (METS): >4 METS.    Chart reviewed.   Existing labs reviewed. Patient summary reviewed.    Patient is not a current smoker.              Induction- intravenous.    Postoperative Plan-     Informed Consent- Anesthetic plan and risks discussed with patient.  I personally reviewed this patient with the CRNA. Discussed and agreed on the Anesthesia Plan with the CRNA..

## 2024-02-15 PROCEDURE — 88307 TISSUE EXAM BY PATHOLOGIST: CPT | Performed by: STUDENT IN AN ORGANIZED HEALTH CARE EDUCATION/TRAINING PROGRAM

## 2024-02-15 PROCEDURE — 88305 TISSUE EXAM BY PATHOLOGIST: CPT | Performed by: STUDENT IN AN ORGANIZED HEALTH CARE EDUCATION/TRAINING PROGRAM

## 2024-02-15 PROCEDURE — 88344 IMHCHEM/IMCYTCHM EA MLT ANTB: CPT | Performed by: STUDENT IN AN ORGANIZED HEALTH CARE EDUCATION/TRAINING PROGRAM

## 2024-02-26 ENCOUNTER — NURSE TRIAGE (OUTPATIENT)
Age: 59
End: 2024-02-26

## 2024-02-26 NOTE — TELEPHONE ENCOUNTER
Pt's son calling on behalf of mother due to language barrier; Confirmed pt HIPAA and demographics. Discussed vaginal discharge concerns after mother's Leep procedure. Denies fever, pelvic pain, foul odor. Yellow discharge that has increased since 2/12/24 procedure. Denies bleeding or bright red discharge. RN provided home care advice. Pt's son agreeable to plan. Pt plans to keep follow up appt on 3/6/24.

## 2024-02-26 NOTE — TELEPHONE ENCOUNTER
"Reason for Disposition  • Abnormal color vaginal discharge (i.e., yellow, green, gray)  • Normal vaginal discharge    Answer Assessment - Initial Assessment Questions  1. SYMPTOM: \"What's the main symptom you're concerned about?\" (e.g., pain, fever, vomiting)      Vaginal drainage a week ago a few drops; now 2nd week increased - Yellow. Slight odor.   2. ONSET: \"When did s/s  start?\"      Week ago, after Leep  3. SURGERY: \"What surgery was performed?\"      Leep  4. DATE of SURGERY: \"When was surgery performed?\"       1 week ago  5. ANESTHESIA: \" What type of anesthesia did you have?\" (e.g., general, spinal, epidural, local)      General  6. PAIN: \"Is there any pain?\" If Yes, ask: \"How bad is it?\"  (Scale 1-10; or mild, moderate, severe)      Denies  7. FEVER: \"Do you have a fever?\" If Yes, ask: \"What is your temperature, how was it measured, and when did it start?\"      Denies  8. VOMITING: \"Is there any vomiting?\" If yes, ask: \"How many times?\"      Denies  9. BLEEDING: \"Is there any bleeding?\" If Yes, ask: \"How much?\" and \"Where?\"      Denies  10. OTHER SYMPTOMS: \"Do you have any other symptoms?\" (e.g., drainage from wound, painful urination, constipation)        Denes    Answer Assessment - Initial Assessment Questions  1. DISCHARGE: \"Describe the discharge.\" (e.g., white, yellow, green, gray, foamy, cottage cheese-like)      Yellow  2. ODOR: \"Is there a bad odor?\"      Slight  3. ONSET: \"When did the discharge begin?\"      This week; 2nd week after surgery  4. RASH: \"Is there a rash in that area?\" If Yes, ask: \"Describe it.\" (e.g., redness, blisters, sores, bumps)      Denies  5. ABDOMINAL PAIN: \"Are you having any abdominal pain?\" If Yes, ask: \"What does it feel like? \" (e.g., crampy, dull, intermittent, constant)       Denies  6. ABDOMINAL PAIN SEVERITY: If present, ask: \"How bad is it?\"  (e.g., mild, moderate, severe)   - MILD - doesn't interfere with normal activities    - MODERATE - interferes with normal " "activities or awakens from sleep    - SEVERE - patient doesn't want to move (R/O peritonitis)       Denies  7. CAUSE: \"What do you think is causing the discharge?\" \"Have you had the same problem before? What happened then?\"      Post op Leep   8. OTHER SYMPTOMS: \"Do you have any other symptoms?\" (e.g., fever, itching, vaginal bleeding, pain with urination, injury to genital area, vaginal foreign body)      Denies  9. PREGNANCY: \"Is there any chance you are pregnant?\" \"When was your last menstrual period?\"      Denies    Protocols used: Post-Op Symptoms and Questions-ADULT-[place], Vaginal Discharge-ADULT-[place]    "

## 2024-03-06 ENCOUNTER — OFFICE VISIT (OUTPATIENT)
Dept: OBGYN CLINIC | Facility: CLINIC | Age: 59
End: 2024-03-06

## 2024-03-06 VITALS
WEIGHT: 131 LBS | HEIGHT: 60 IN | TEMPERATURE: 98 F | SYSTOLIC BLOOD PRESSURE: 132 MMHG | DIASTOLIC BLOOD PRESSURE: 82 MMHG | BODY MASS INDEX: 25.72 KG/M2

## 2024-03-06 DIAGNOSIS — Z98.890 STATUS POST LEEP (LOOP ELECTROSURGICAL EXCISION PROCEDURE) OF CERVIX: Primary | ICD-10-CM

## 2024-03-06 PROCEDURE — 99024 POSTOP FOLLOW-UP VISIT: CPT | Performed by: OBSTETRICS & GYNECOLOGY

## 2024-03-06 NOTE — PROGRESS NOTES
Assessment/Plan:     Diagnoses and all orders for this visit:    Status post LEEP (loop electrosurgical excision procedure) of cervix        58-year-old female  Pap smear atypical squamous cells cannot rule out high-grade/HPV positive  Colposcopy ROS-3 on ECC/6/8/12  Status post LEEP biopsy ROS-3 margin -2024  Plan  Return to office in 6 months for Pap and HPV         Subjective:      Patient ID: Trey Baeza is a 58 y.o. female.    HPI  Patient seen evaluated present to the office today status post LEEP biopsy of the cervix  Pathology results reviewed and discussed with patient  Patient was having bleeding on and off since the procedure bleeding subsided  Denies any heavy bleeding  Denies any urgency frequency or dysuria  Denies any pelvic pain      A. Endocervix, ECC:  - Scant poorly preserved fragments of endocervix.  - Negative for squamous intraepithelial lesion (DAMIÁN) and malignancy; supported by p16/ki67 double immunostain.      B. Cervix, Cone Biopsy:  - High-grade squamous intraepithelial lesion/cervical intraepithelial neoplasia 3 (HSIL/CIN3) involving endocervical glands.    - Ectocervical, endocervical, and deep margins uninvolved by DAMIÁN.  - Negative for malignancy.  - Deeper levels examined.    All margin is negative recommend repeat Pap and HPV in 6 months plan explained and discussed with patient question answered and patient was satisfied    The following portions of the patient's history were reviewed and updated as appropriate: allergies, current medications, past family history, past medical history, past social history, past surgical history and problem list.    Review of Systems      Objective:      /82 (BP Location: Left arm, Patient Position: Sitting, Cuff Size: Adult)   Temp 98 °F (36.7 °C)   Ht 5' (1.524 m)   Wt 59.4 kg (131 lb)   BMI 25.58 kg/m²          Physical Exam  Constitutional:       Appearance: She is well-developed.   Abdominal:      General: There is no distension.       Palpations: Abdomen is soft.      Tenderness: There is no abdominal tenderness.   Genitourinary:     Labia:         Right: No rash, tenderness or lesion.         Left: No rash, tenderness or lesion.       Vagina: No signs of injury. No vaginal discharge, erythema or tenderness.      Comments: Contact bleeding noted with speculum application  Monsel applied to continue pelvic rest for 2 more weeks  To return to office in 6 months for repeat Pap and HPV  Neurological:      Mental Status: She is alert and oriented to person, place, and time.   Psychiatric:         Behavior: Behavior normal.

## 2024-03-30 NOTE — PROGRESS NOTES
Assessment/Plan:    No problem-specific Assessment & Plan notes found for this encounter. Diagnoses and all orders for this visit:    Encounter for gynecological examination without abnormal finding  -     Liquid-based pap, screening    Screening for HPV (human papillomavirus)  -     Liquid-based pap, screening    Encounter for screening for cervical cancer  -     Ambulatory Referral to Gynecology        Pap done. Order for mammogram already in [facility]. Recommended patient to get 1200 mg calcium daily + vitamin D. If no problems, patient to return in 1 year for routine gyn care. Subjective:      Patient ID: Caitlin Cutler is a 59 y.o. female. Patient is here for yearly gyn exam.  She is new to our office today. States it has been several years since her last gyn visit. States she is doing well overall. Went through menopause about 10 years ago. No lingering symptoms. Patient denies bowel/bladder changes, vaginal bleeding, pelvic pain, bloating, abdominal pain, n/v, change in appetite, and thyroid disease. No history of abnormal Paps. Has not had colon cancer screening. Does not take calcium. Mammogram is scheduled for December. She is performing self-breast exam.  Has history of benign mass removal from R breast.  Denies new masses, skin changes, nipple discharge, and pain/tenderness. Family cancer history negative. The following portions of the patient's history were reviewed and updated as appropriate: allergies, current medications, past family history, past medical history, past social history, past surgical history and problem list.    Review of Systems   Constitutional: Negative for appetite change and unexpected weight change. Cardiovascular:        No masses, skin changes, nipple discharge, and pain/tenderness. Gastrointestinal: Negative for abdominal distention, abdominal pain, constipation, diarrhea, nausea and vomiting.    Genitourinary: Negative for difficulty urinating, dysuria, frequency, genital sores, hematuria, menstrual problem, pelvic pain, urgency, vaginal bleeding, vaginal discharge and vaginal pain. Objective:      /90 (BP Location: Left arm, Patient Position: Sitting, Cuff Size: Adult)   Ht 5' (1.524 m)   Wt 59 kg (130 lb)   BMI 25.39 kg/m²          Physical Exam  Vitals reviewed. Exam conducted with a chaperone present. Constitutional:       Appearance: Normal appearance. She is well-developed and normal weight. Neck:      Thyroid: No thyromegaly. Pulmonary:      Effort: Pulmonary effort is normal.   Chest:   Breasts:     Breasts are symmetrical.      Right: Normal. No swelling, bleeding, inverted nipple, mass, nipple discharge, skin change or tenderness. Left: Normal. No swelling, bleeding, inverted nipple, mass, nipple discharge, skin change or tenderness. Abdominal:      General: Abdomen is flat. There is no distension. Palpations: Abdomen is soft. Tenderness: There is no abdominal tenderness. Genitourinary:     General: Normal vulva. Pubic Area: No rash. Labia:         Right: No rash, tenderness, lesion or injury. Left: No rash, tenderness, lesion or injury. Vagina: Normal. No vaginal discharge, erythema, tenderness or bleeding. Cervix: Normal.      Uterus: Normal.       Adnexa: Right adnexa normal and left adnexa normal.        Right: No mass, tenderness or fullness. Left: No mass, tenderness or fullness. Comments: Moderate vaginal atrophy present. Musculoskeletal:      Cervical back: Neck supple. Lymphadenopathy:      Cervical: No cervical adenopathy. Upper Body:      Right upper body: No supraclavicular or axillary adenopathy. Left upper body: No supraclavicular or axillary adenopathy. Lower Body: No right inguinal adenopathy. No left inguinal adenopathy. Skin:     General: Skin is warm and dry.    Neurological:      Mental Status: She is alert and oriented to person, place, and time. Psychiatric:         Mood and Affect: Mood normal.         Behavior: Behavior normal. Behavior is cooperative. Thought Content:  Thought content normal.         Judgment: Judgment normal.  used

## 2024-04-18 DIAGNOSIS — E11.65 TYPE 2 DIABETES MELLITUS WITH HYPERGLYCEMIA, WITHOUT LONG-TERM CURRENT USE OF INSULIN (HCC): ICD-10-CM

## 2024-04-18 DIAGNOSIS — I10 ESSENTIAL HYPERTENSION: ICD-10-CM

## 2024-04-18 DIAGNOSIS — E78.2 MIXED HYPERLIPIDEMIA: ICD-10-CM

## 2024-04-19 RX ORDER — ATORVASTATIN CALCIUM 40 MG/1
40 TABLET, FILM COATED ORAL DAILY
Qty: 90 TABLET | Refills: 1 | Status: SHIPPED | OUTPATIENT
Start: 2024-04-19

## 2024-04-19 RX ORDER — BENAZEPRIL/HYDROCHLOROTHIAZIDE 20 MG-25MG
1 TABLET ORAL DAILY
Qty: 90 TABLET | Refills: 1 | Status: SHIPPED | OUTPATIENT
Start: 2024-04-19

## 2024-05-10 ENCOUNTER — TELEPHONE (OUTPATIENT)
Age: 59
End: 2024-05-10

## 2024-05-10 NOTE — TELEPHONE ENCOUNTER
Patient's son  called the RX Refill Line. Message is being forwarded to the office.     Patient's son is requesting a call back in regards to the bill his mother got for $20,000 - he was wondering if the surgery was pre approved why they got a bill so high. Advised him that a PA was done for the procedure  (see encounter 01/23/24) he is asking if there is a way to get the proof the PA was approved. Please advised - also recommended he reach out to billing to see what was missed.     Please contact patient at

## 2024-05-15 ENCOUNTER — TELEPHONE (OUTPATIENT)
Age: 59
End: 2024-05-15

## 2024-05-15 NOTE — TELEPHONE ENCOUNTER
Patients son called returning the call he received from the office regarding bill his mom received. Lost connection with him during call.

## 2024-05-15 NOTE — TELEPHONE ENCOUNTER
Patient's son called back. EOB was received not a bill from . Son is aware to call if a bill from  comes through.

## 2024-07-27 DIAGNOSIS — I10 ESSENTIAL HYPERTENSION: ICD-10-CM

## 2024-07-27 DIAGNOSIS — E11.65 TYPE 2 DIABETES MELLITUS WITH HYPERGLYCEMIA, WITHOUT LONG-TERM CURRENT USE OF INSULIN (HCC): ICD-10-CM

## 2024-07-27 DIAGNOSIS — E78.2 MIXED HYPERLIPIDEMIA: ICD-10-CM

## 2024-07-28 RX ORDER — ATORVASTATIN CALCIUM 40 MG/1
40 TABLET, FILM COATED ORAL DAILY
Qty: 90 TABLET | Refills: 1 | Status: SHIPPED | OUTPATIENT
Start: 2024-07-28

## 2024-07-28 RX ORDER — BENAZEPRIL/HYDROCHLOROTHIAZIDE 20 MG-25MG
1 TABLET ORAL DAILY
Qty: 90 TABLET | Refills: 1 | Status: SHIPPED | OUTPATIENT
Start: 2024-07-28

## 2024-08-14 ENCOUNTER — TELEPHONE (OUTPATIENT)
Dept: FAMILY MEDICINE CLINIC | Facility: CLINIC | Age: 59
End: 2024-08-14

## 2024-09-24 ENCOUNTER — ANNUAL EXAM (OUTPATIENT)
Dept: OBGYN CLINIC | Facility: CLINIC | Age: 59
End: 2024-09-24
Payer: COMMERCIAL

## 2024-09-24 VITALS
WEIGHT: 127.8 LBS | DIASTOLIC BLOOD PRESSURE: 90 MMHG | HEIGHT: 60 IN | SYSTOLIC BLOOD PRESSURE: 134 MMHG | BODY MASS INDEX: 25.09 KG/M2

## 2024-09-24 DIAGNOSIS — Z01.419 WELL WOMAN EXAM WITH ROUTINE GYNECOLOGICAL EXAM: Primary | ICD-10-CM

## 2024-09-24 PROCEDURE — S0612 ANNUAL GYNECOLOGICAL EXAMINA: HCPCS | Performed by: OBSTETRICS & GYNECOLOGY

## 2024-09-24 PROCEDURE — G0124 SCREEN C/V THIN LAYER BY MD: HCPCS | Performed by: STUDENT IN AN ORGANIZED HEALTH CARE EDUCATION/TRAINING PROGRAM

## 2024-09-24 PROCEDURE — G0476 HPV COMBO ASSAY CA SCREEN: HCPCS | Performed by: OBSTETRICS & GYNECOLOGY

## 2024-09-24 PROCEDURE — G0145 SCR C/V CYTO,THINLAYER,RESCR: HCPCS | Performed by: STUDENT IN AN ORGANIZED HEALTH CARE EDUCATION/TRAINING PROGRAM

## 2024-09-24 NOTE — PROGRESS NOTES
Subjective      Trey Baeza is a 59 y.o. female who presents for annual well woman exam. P      Menstrual History:  OB History          2    Para        Term                AB        Living   2         SAB        IAB        Ectopic        Multiple        Live Births   2                  No LMP recorded. Patient is postmenopausal.       The following portions of the patient's history were reviewed and updated as appropriate: allergies, current medications, past family history, past medical history, past social history, past surgical history, and problem list.    Review of Systems  Review of Systems   Constitutional:  Negative for activity change, appetite change, chills, fatigue and fever.   Respiratory:  Negative for apnea, cough, chest tightness and shortness of breath.    Cardiovascular:  Negative for chest pain, palpitations and leg swelling.   Gastrointestinal:  Negative for abdominal pain, constipation, diarrhea, nausea and vomiting.   Genitourinary:  Negative for difficulty urinating, dysuria, flank pain, frequency, hematuria and urgency.   Neurological:  Negative for dizziness, seizures, syncope, light-headedness, numbness and headaches.   Psychiatric/Behavioral:  Negative for agitation and confusion.           Objective      /90 (BP Location: Left arm, Patient Position: Sitting, Cuff Size: Adult)   Ht 5' (1.524 m)   Wt 58 kg (127 lb 12.8 oz)   BMI 24.96 kg/m²     Physical Exam  OBGyn Exam     General:   alert and oriented, in no acute distress, alert, appears stated age, and cooperative   Heart: regular rate and rhythm, S1, S2 normal, no murmur, click, rub or gallop   Lungs: clear to auscultation bilaterally   Abdomen: soft, non-tender, without masses or organomegaly   Vulva: normal   Vagina: Rectocele/laxity     Cervix: Status post cone biopsy very short cervix   Uterus: normal size   Adnexa:  Breast Exam:  normal adnexa  breasts appear normal, no suspicious masses, no skin or nipple  changes or axillary nodes.                Assessment      @well woman@ .   59-year-old female  Annual exam  Prior 2 vaginal delivery  Pap smear atypical squamous cells cannot rule out high-grade/HPV positive  Colposcopy ROS-3 on ECC/6/8/12  Status post LEEP biopsy ROS-3 margin -2024    Plan  Pap/HPV  Diet/exercise  Calcium/vitamin D  Colonoscopy recommended desires to hold off for now   to go for her left breast ultrasound and mammogram  Return to office for annual exam   All questions answered.     There are no Patient Instructions on file for this visit.

## 2024-09-25 LAB
HPV HR 12 DNA CVX QL NAA+PROBE: NEGATIVE
HPV16 DNA CVX QL NAA+PROBE: NEGATIVE
HPV18 DNA CVX QL NAA+PROBE: NEGATIVE

## 2024-09-30 LAB
LAB AP GYN PRIMARY INTERPRETATION: ABNORMAL
Lab: ABNORMAL
PATH INTERP SPEC-IMP: ABNORMAL

## 2024-10-02 ENCOUNTER — HOSPITAL ENCOUNTER (OUTPATIENT)
Dept: RADIOLOGY | Facility: HOSPITAL | Age: 59
Discharge: HOME | End: 2024-10-02
Payer: COMMERCIAL

## 2024-10-02 DIAGNOSIS — R92.8 ABNORMAL MAMMOGRAM OF LEFT BREAST: ICD-10-CM

## 2024-10-02 PROCEDURE — 77065 DX MAMMO INCL CAD UNI: CPT

## 2024-10-02 PROCEDURE — G0279 TOMOSYNTHESIS, MAMMO: HCPCS

## 2024-10-02 PROCEDURE — 76642 ULTRASOUND BREAST LIMITED: CPT

## 2024-10-08 ENCOUNTER — OFFICE VISIT (OUTPATIENT)
Dept: FAMILY MEDICINE CLINIC | Facility: CLINIC | Age: 59
End: 2024-10-08
Payer: COMMERCIAL

## 2024-10-08 VITALS
HEART RATE: 76 BPM | OXYGEN SATURATION: 99 % | BODY MASS INDEX: 25.32 KG/M2 | SYSTOLIC BLOOD PRESSURE: 134 MMHG | RESPIRATION RATE: 14 BRPM | WEIGHT: 129 LBS | DIASTOLIC BLOOD PRESSURE: 76 MMHG | HEIGHT: 60 IN | TEMPERATURE: 97.7 F

## 2024-10-08 DIAGNOSIS — M06.9 RHEUMATOID ARTHRITIS, INVOLVING UNSPECIFIED SITE, UNSPECIFIED WHETHER RHEUMATOID FACTOR PRESENT (HCC): ICD-10-CM

## 2024-10-08 DIAGNOSIS — Z12.11 COLON CANCER SCREENING: ICD-10-CM

## 2024-10-08 DIAGNOSIS — Z00.01 ENCOUNTER FOR GENERAL ADULT MEDICAL EXAMINATION WITH ABNORMAL FINDINGS: Primary | ICD-10-CM

## 2024-10-08 DIAGNOSIS — E78.2 MIXED HYPERLIPIDEMIA: ICD-10-CM

## 2024-10-08 DIAGNOSIS — E11.65 TYPE 2 DIABETES MELLITUS WITH HYPERGLYCEMIA, WITHOUT LONG-TERM CURRENT USE OF INSULIN (HCC): ICD-10-CM

## 2024-10-08 DIAGNOSIS — Z13.29 THYROID DISORDER SCREENING: ICD-10-CM

## 2024-10-08 DIAGNOSIS — Z23 ENCOUNTER FOR IMMUNIZATION: ICD-10-CM

## 2024-10-08 LAB
LEFT EYE DIABETIC RETINOPATHY: ABNORMAL
LEFT EYE IMAGE QUALITY: ABNORMAL
LEFT EYE MACULAR EDEMA: ABNORMAL
LEFT EYE OTHER RETINOPATHY: ABNORMAL
RIGHT EYE DIABETIC RETINOPATHY: ABNORMAL
RIGHT EYE IMAGE QUALITY: ABNORMAL
RIGHT EYE MACULAR EDEMA: ABNORMAL
RIGHT EYE OTHER RETINOPATHY: ABNORMAL
SEVERITY (EYE EXAM): ABNORMAL
SL AMB POCT HEMOGLOBIN AIC: 6.8 (ref ?–6.5)

## 2024-10-08 PROCEDURE — 82570 ASSAY OF URINE CREATININE: CPT | Performed by: FAMILY MEDICINE

## 2024-10-08 PROCEDURE — 82043 UR ALBUMIN QUANTITATIVE: CPT | Performed by: FAMILY MEDICINE

## 2024-10-08 PROCEDURE — 83036 HEMOGLOBIN GLYCOSYLATED A1C: CPT | Performed by: FAMILY MEDICINE

## 2024-10-08 PROCEDURE — 99214 OFFICE O/P EST MOD 30 MIN: CPT | Performed by: FAMILY MEDICINE

## 2024-10-08 PROCEDURE — 99396 PREV VISIT EST AGE 40-64: CPT | Performed by: FAMILY MEDICINE

## 2024-10-08 NOTE — ASSESSMENT & PLAN NOTE
Lab Results   Component Value Date    HGBA1C 6.8 (A) 10/08/2024       Orders:    IRIS Diabetic eye exam    POCT hemoglobin A1c    Hemoglobin A1C; Future    Hemoglobin A1C    Albumin / creatinine urine ratio    Ambulatory Referral to Ophthalmology; Future

## 2024-10-09 LAB
CREAT UR-MCNC: 23.7 MG/DL
MICROALBUMIN UR-MCNC: <7 MG/L

## 2024-11-18 ENCOUNTER — OFFICE VISIT (OUTPATIENT)
Age: 59
End: 2024-11-18
Payer: COMMERCIAL

## 2024-11-18 ENCOUNTER — TELEPHONE (OUTPATIENT)
Age: 59
End: 2024-11-18

## 2024-11-18 VITALS
TEMPERATURE: 97.8 F | SYSTOLIC BLOOD PRESSURE: 142 MMHG | HEIGHT: 60 IN | WEIGHT: 127 LBS | DIASTOLIC BLOOD PRESSURE: 88 MMHG | RESPIRATION RATE: 16 BRPM | HEART RATE: 84 BPM | OXYGEN SATURATION: 98 % | BODY MASS INDEX: 24.94 KG/M2

## 2024-11-18 DIAGNOSIS — J02.9 PHARYNGITIS, UNSPECIFIED ETIOLOGY: Primary | ICD-10-CM

## 2024-11-18 PROCEDURE — 99213 OFFICE O/P EST LOW 20 MIN: CPT | Performed by: INTERNAL MEDICINE

## 2024-11-18 RX ORDER — CEPHALEXIN 500 MG/1
500 CAPSULE ORAL EVERY 6 HOURS SCHEDULED
Qty: 28 CAPSULE | Refills: 0 | Status: SHIPPED | OUTPATIENT
Start: 2024-11-18 | End: 2024-11-25

## 2024-11-18 NOTE — PROGRESS NOTES
Name: Trey Baeza      : 1965      MRN: [id number]  Encounter Provider: Dandre Taylor MD  Encounter Date: 2024   Encounter department: [facility]  :  Assessment & Plan  Pharyngitis, unspecified etiology  Discussed supportive management, trial of antibiotics, follow-up as needed if symptoms are not improved  Orders:    cephalexin (KEFLEX) 500 mg capsule; Take 1 capsule (500 mg total) by mouth every 6 (six) hours for 7 days           History of Present Illness     HPI  Review of Systems   Constitutional:  Negative for activity change, appetite change, chills, diaphoresis, fatigue, fever and unexpected weight change.   HENT:  Positive for congestion. Negative for drooling, ear discharge, ear pain, facial swelling, hearing loss, postnasal drip, rhinorrhea, sinus pressure, sinus pain, sneezing, sore throat, tinnitus, trouble swallowing and voice change.    Eyes:  Negative for discharge.   Respiratory:  Positive for cough. Negative for apnea, choking, chest tightness, shortness of breath, wheezing and stridor.    Cardiovascular:  Negative for chest pain, palpitations and leg swelling.   Gastrointestinal:  Negative for abdominal distention, abdominal pain, anal bleeding, blood in stool, constipation, diarrhea, nausea and vomiting.   Genitourinary:  Negative for decreased urine volume, difficulty urinating, frequency and urgency.   Musculoskeletal:  Negative for arthralgias, back pain and myalgias.   Skin:  Negative for color change.   Neurological:  Negative for dizziness, light-headedness, numbness and headaches.     Medical History Reviewed by provider this encounter:  Tobacco  Allergies  Meds  Problems  Med Hx  Surg Hx  Fam Hx     .  Past Medical History   Past Medical History:   Diagnosis Date    Diabetes mellitus (HCC)     Hyperlipidemia     Hypertension     Rheumatoid arthritis (HCC)      Past Surgical History:   Procedure Laterality Date    BREAST CYST EXCISION  Right     benign    IN CONIZATION CERVIX W/WO D&C RPR ELTRD EXC N/A 2/12/2024    Procedure: BIOPSY LEEP CERVIX;  Surgeon: Nikkie Rocha MD;  Location:  MAIN OR;  Service: Gynecology    TUBAL LIGATION       Family History   Problem Relation Age of Onset    No Known Problems Mother     No Known Problems Father     No Known Problems Brother     No Known Problems Brother       reports that she has never smoked. She has never been exposed to tobacco smoke. She has never used smokeless tobacco. She reports that she does not currently use alcohol. She reports that she does not use drugs.  Current Outpatient Medications on File Prior to Visit   Medication Sig Dispense Refill    atorvastatin (LIPITOR) 40 mg tablet TAKE 1 TABLET BY MOUTH EVERY DAY 90 tablet 1    benazepril-hydrochlorthiazide (LOTENSIN HCT) 20-25 MG per tablet TAKE 1 TABLET BY MOUTH EVERY DAY 90 tablet 1    folic acid (FOLVITE) 1 mg tablet Take by mouth daily      metFORMIN (GLUCOPHAGE) 1000 MG tablet TAKE 1 TABLET BY MOUTH EVERY DAY WITH BREAKFAST 90 tablet 1    methotrexate 2.5 MG tablet 3 tablet once a week       No current facility-administered medications on file prior to visit.   No Known Allergies   Current Outpatient Medications on File Prior to Visit   Medication Sig Dispense Refill    atorvastatin (LIPITOR) 40 mg tablet TAKE 1 TABLET BY MOUTH EVERY DAY 90 tablet 1    benazepril-hydrochlorthiazide (LOTENSIN HCT) 20-25 MG per tablet TAKE 1 TABLET BY MOUTH EVERY DAY 90 tablet 1    folic acid (FOLVITE) 1 mg tablet Take by mouth daily      metFORMIN (GLUCOPHAGE) 1000 MG tablet TAKE 1 TABLET BY MOUTH EVERY DAY WITH BREAKFAST 90 tablet 1    methotrexate 2.5 MG tablet 3 tablet once a week       No current facility-administered medications on file prior to visit.      Social History     Tobacco Use    Smoking status: Never     Passive exposure: Never    Smokeless tobacco: Never   Vaping Use    Vaping status: Never Used   Substance and Sexual Activity     Alcohol use: Not Currently    Drug use: Never    Sexual activity: Not Currently        Objective   /88 (BP Location: Left arm, Patient Position: Sitting, Cuff Size: Standard)   Pulse 84   Temp 97.8 °F (36.6 °C) (Tympanic)   Resp 16   Ht 5' (1.524 m)   Wt 57.6 kg (127 lb)   SpO2 98%   BMI 24.80 kg/m²      Physical Exam  Constitutional:       General: She is not in acute distress.     Appearance: Normal appearance. She is normal weight. She is not ill-appearing, toxic-appearing or diaphoretic.   HENT:      Nose: Congestion present.      Right Turbinates: Swollen.      Left Turbinates: Enlarged. Not swollen.      Mouth/Throat:      Pharynx: Posterior oropharyngeal erythema present. No oropharyngeal exudate.   Cardiovascular:      Rate and Rhythm: Normal rate and regular rhythm.      Pulses: Normal pulses.      Heart sounds: Normal heart sounds. No murmur heard.     No gallop.   Pulmonary:      Effort: Pulmonary effort is normal. No respiratory distress.      Breath sounds: Normal breath sounds. No stridor. No wheezing, rhonchi or rales.   Chest:      Chest wall: No tenderness.   Musculoskeletal:      Right lower leg: No edema.      Left lower leg: No edema.   Neurological:      Mental Status: She is alert and oriented to person, place, and time.

## 2025-02-28 ENCOUNTER — RESULTS FOLLOW-UP (OUTPATIENT)
Dept: OBGYN CLINIC | Facility: CLINIC | Age: 60
End: 2025-02-28

## 2025-04-15 DIAGNOSIS — E11.65 TYPE 2 DIABETES MELLITUS WITH HYPERGLYCEMIA, WITHOUT LONG-TERM CURRENT USE OF INSULIN (HCC): ICD-10-CM

## 2025-04-16 NOTE — TELEPHONE ENCOUNTER
She is due for her 6-month follow-up needs labs as well as mammogram and colonoscopy.  Please schedule a follow-up.

## 2025-04-30 DIAGNOSIS — E11.65 TYPE 2 DIABETES MELLITUS WITH HYPERGLYCEMIA, WITHOUT LONG-TERM CURRENT USE OF INSULIN (HCC): ICD-10-CM

## 2025-06-10 DIAGNOSIS — I10 ESSENTIAL HYPERTENSION: ICD-10-CM

## 2025-06-10 RX ORDER — BENAZEPRIL/HYDROCHLOROTHIAZIDE 20 MG-25MG
1 TABLET ORAL DAILY
Qty: 90 TABLET | Refills: 0 | Status: SHIPPED | OUTPATIENT
Start: 2025-06-10

## 2025-06-10 NOTE — TELEPHONE ENCOUNTER
Patient called to request a refill for their benazepril-  advised a refill was requested on 6/10/2025 and is pending approval. Patient verbalized understanding and is in agreement.     Does the patient have enough for 3 days?   [] Yes   [x] No - Send as HP to POD

## 2025-07-09 ENCOUNTER — OFFICE VISIT (OUTPATIENT)
Age: 60
End: 2025-07-09
Payer: COMMERCIAL

## 2025-07-09 VITALS
HEIGHT: 59 IN | DIASTOLIC BLOOD PRESSURE: 89 MMHG | WEIGHT: 134 LBS | TEMPERATURE: 98.2 F | SYSTOLIC BLOOD PRESSURE: 152 MMHG | BODY MASS INDEX: 27.01 KG/M2 | RESPIRATION RATE: 18 BRPM | HEART RATE: 92 BPM | OXYGEN SATURATION: 95 %

## 2025-07-09 DIAGNOSIS — M06.9 RHEUMATOID ARTHRITIS, INVOLVING UNSPECIFIED SITE, UNSPECIFIED WHETHER RHEUMATOID FACTOR PRESENT (HCC): ICD-10-CM

## 2025-07-09 DIAGNOSIS — Z76.89 ESTABLISHING CARE WITH NEW DOCTOR, ENCOUNTER FOR: ICD-10-CM

## 2025-07-09 DIAGNOSIS — E78.2 MIXED HYPERLIPIDEMIA: Primary | ICD-10-CM

## 2025-07-09 DIAGNOSIS — I10 ESSENTIAL HYPERTENSION: ICD-10-CM

## 2025-07-09 DIAGNOSIS — E66.3 OVERWEIGHT WITH BODY MASS INDEX (BMI) OF 26 TO 26.9 IN ADULT: ICD-10-CM

## 2025-07-09 DIAGNOSIS — R92.8 ABNORMAL MAMMOGRAM: ICD-10-CM

## 2025-07-09 DIAGNOSIS — E11.65 TYPE 2 DIABETES MELLITUS WITH HYPERGLYCEMIA, WITHOUT LONG-TERM CURRENT USE OF INSULIN (HCC): ICD-10-CM

## 2025-07-09 LAB — SL AMB POCT HEMOGLOBIN AIC: 7.6 (ref ?–6.5)

## 2025-07-09 PROCEDURE — 99214 OFFICE O/P EST MOD 30 MIN: CPT | Performed by: INTERNAL MEDICINE

## 2025-07-09 PROCEDURE — 83036 HEMOGLOBIN GLYCOSYLATED A1C: CPT | Performed by: INTERNAL MEDICINE

## 2025-07-09 NOTE — ASSESSMENT & PLAN NOTE
On methotrexate, follows with rheumatologist  Orders:    Albumin / creatinine urine ratio    CBC and differential    Comprehensive metabolic panel

## 2025-07-09 NOTE — ASSESSMENT & PLAN NOTE
Repeat blood pressure was elevated.  Son reports home BPs are stable, continue to monitor at home, encouraged DASH diet, continue current medications  Orders:    Albumin / creatinine urine ratio    CBC and differential    Comprehensive metabolic panel

## 2025-07-09 NOTE — ASSESSMENT & PLAN NOTE
Lab Results   Component Value Date    HGBA1C 7.6 (A) 07/09/2025   HbA1c is uncontrolled.  Patient admits to dietary indiscretion and lack of physical activity.  Patient and son wants to defer up titration of medication.  They will work on lifestyle modification.  We extensively discussed dietary modifications including carb controlled diet and portion restriction, increased physical activity.  Follow-up with blood work in 3 months    Orders:    POCT hemoglobin A1c    Albumin / creatinine urine ratio    CBC and differential    Comprehensive metabolic panel

## 2025-07-09 NOTE — PATIENT INSTRUCTIONS
"  Patient Education     ???????? ??? ?????   ????? ???????   UpToDate ???? ??????? ??? ???????? ????? ?????   ?? ????? ???????? ?? ??? ????? ????????? ???? ??? -- ????? ????????? ?? ?????? ?? ???????? ?? ???? ?? ????? ??? ???? ???? ????? ??? ???? ???????? ?? ???? ???? ??? ?? ?? ?? ????? ?? ??? ????? ????? ??, ??? ???? ?? ??? ????? ??? ????? ?? ???? ???????? ?? ???? ???? ????????? ?? ??? ?? ??:   ???? ???? ????? ??? ?? ???? '?? ??? ???? ???? ????   ???? ???? ???? ????????? ??? ????, ????? ?? ??? ??? ????? ???? ?????????, ?? ?? ???????? ???? ????? ???? ?? ?????? ??  ?????? ????? ??? ???? ??? ??????, ??? ???? ???????, ??? ??? ?????????? ?? ???? ???? ?? ??? ??? ???? ??? ?? ??????? ????????? ???? ????? ??? ???????? ?? ?????? ?? ??? ????? ???? ????????? ???? ?? ?????? ??, ????? ?? ??? ?? ???? ??? ???????  ???? ????? ???? ???? ??? ???? ??? ?????? -- ?????? ????? ???? ?? ???? ?? ????? ????? ????? ?????? ????? ??? ??? ?????? ??? ??? ?????? ?? ?? ?? ???????? ?? ???? ?? ?? ????? \"?????????\" (????? ????) ??? ??? ???? ????????? ???? ??? ????? ?? ???? ????? ???  ?? ????? ?? ???? ???? ???? '?? ???? ?????? ??? -- ??????? ???? ??? ????? ??? ???? ?????? ??, ?? ??? ??? ???, ???????? ???? ?????? '?? ????? ???? ?? ?? ????? ????? ??? ?????? ??:   ?? ??? ?? ???? ???? ???? '?? ??????? ?? ???? ????? ????? ?? (??? ??? \"???? ??????\" ???? ????? ??) ??? ???? ???? '?? ???? ?????? ??? ?? ????? ????? ?? ?? ??? ?? ?? ??????? ?? ???? ??? ????? ?????? ?????? ????? ??, ????? ?? ???????????? ?? ???? ???? ???? '?? ???? ??? ??? ??? ???? ????? ??? ???? ???? ??? ????? ???   ?? ??? ?? ???? ???? ??????? ?? ????? ??? ???? ??? ??????? ???? ?? (??? ??? \"?????? ??????\" ???? ????? ??) ??? ??????? ???? ???? '?? ???? ???? ???? ????? ??? ????? ?? ?? ?? ?????? ?? ??? ?? ????? ????? ??? ?????? ???? ??????? ?? ????? ??? ???? ?? ???? ??, ??? ?? ????? ???? ??????? ?? ???? ?????? ????? ??? ??????? ?? ?? ???? ???   ?? ??? ??????? ?????? ????? ?? ?? ?? ??? '?? ??? ???? " "????? ?? ???? ???? ??????, ????? ?? ????????? (metformin), ????? ??? ?? ???? ???? ???? '?? ???? ??? ?? ??? ???? ????? ??  ?? ??? ?? ????? ????? ???? ????? ??? ??? ???? ???? ?? ??? ??? -- ???? ???? ?? ???? ??? ???? ??????? ???? ??? ????? ?? ??????? ??? ???? ?????????????, ??????? ??? ??? (????) ?????? ????  ???? ????? ??????? ???? ?? ?? ???? ??, ???????? ???? ????? ??? ?? ???? ????? ?????? ??:   ????????????? (??? \"????\") - ????????????? ?? ???? ?? ?? ???? ???? ???? ?? ????? ??? ?? ?????? ?? ???? ????? ?? ???? ??? ??? ???? ??? ?????? ?????, ???, ??? ????????? ??????? ?????? ?? ??????? ???? ???? ??? ???? ???? ????? ????????????? ???? ?????? ??? ????????????? ???? ?????? ???? ????? ??:   ????, ????? ??? ????   ??????? ??? ??   ????? ???? ?????   ?? ??? ??? ??? ???? ????? ??????? ???? ??????? ???? ????? ?? ??  ???? ??????????????? ??? ????, ???????, ???? ???? ??? ??? ??? ???? ???? ??? ?????? ???? ?? ??? ???? ??? ????, ??? ??? ?????? ??????? ???? ???? ????? ?? ??? ???? ??????? ??? ?????? ???? ?? ??? ???? ???   ??????? - ?????? ?????, ???, ??? ????????? ??????? ?????? ?? ??????? ?? ???? ????? ??????? ???? ?????? ??? ??? (??? ??? ????) ???, ????, ????, ?????, ???, ???? ?????, ?????? ??? ??? ???? ?? ??? ???? ??? ???????? ??? ????? ?? ????, ???-??? ??? ????? ??? ?????? ??? ??? ????? ??? ????? ????   ??? (????) - ????? ??? ???? ?? ??? ????? ??, ?? ??? ?? ????? ????? ?????? ????????? ??? \"?????????\" ??? \"??????\" ??? ?????? ??? ???? ?????????, ????? ?? ??? ?? ???? ??? ?? ???? ??? ??? ???? ???   ????????? ??? ???? ?????? ???? ???, ????, ???? ??? ??? ????? ????? ??   ?????? ??? ???? ???? ??????? ?? ?????? ???? ???? \"????? ??? '?? ????????????? ?????\" ???? ???????? ???? ????? ????? ??? ?? ???? ??? ??? ????, ???? ??? ??????? ???? ??????, ?????, ??? ??? ??? ????? ?? ???? ???  \"???????????????\" ??? \"???????????????\" ??? ?????? ?? ????? ??? ?? ???? ?? ??? ???? ???? ???? ????, ???????, ????? ?? ??? ??? ?????? ????? ???   ?????? ????? - "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pToDate ??? ??? ??? ????: Mar 27, 2024.  ????? 89135 ?????? [email or web address]  Release: 32.2.4 - C32.85  © 2024 [facility] ???/??? ???? ??????? All rights reserved.  ??????? ??????? ????? ??? ???????   ??????? ??????? ????? ??? ???????: ?? ????? ??????? ????, ????, ???/??? ?????? ?? ??????? ?? ???? ??? ??? ?? ?????? ????? ?? ???? ?? ??? ?? ??? ??????? ??? ?????? ???? ??? ???? ?? ??????? ??? ???? ???/??? ??????? ??? ???? ??? ??? ?? ??? ???? ???? ????? ???? ?????? ??? ?? ???? ??????, ??????, ??????, ???? ???????? ??? ?????? ???? ???? ??????? ????? ???? ?? ?? ???? ??? ????? '?? ???? ?? ???? ????? ?? ???? ???? ???? ????????? ?? ???? ??? ????? ???? ??? ??? ?????? ?????? ?? ??????? ?? ???? '?? ?????? ???? ??? ???? ???? ???? ????????? ?? ?????? ????, ???? ??? ???? ?? ??? ????? ?? ???? ??? ??????? ??? ???? ????, ?????? ??????, ??? ???? ?? ??????? ???? ???? ??????? ??? ?? ??? ???? ????? ??????? ??? ??? ???? ?????? ??, ??? ???? ?????? ?? ????? ?????? ??? ?? ???? ??? ??? ????? ???? ?? ??????? ???? ?? ???? ??? ?????? ?? ????????, ?????, ??? ???? ??? ????  ?? ???? ??? ?? ????????? ??? ???? ????? ???? ???? ??? ????????, ???. ??? ???? ??????? ?? ??????? ??? ???? ????? ??? ????? ???? ?? ?????? ??? ??????? ??? ????? ???? ????? ??????? ?? ????? ??? ????? ???? ????? ?????? ??????? ???? ????? ??, ?? ?? ???? ????? ?? [email or web address]/know/clinical-effectiveness-terms ???????? © 2024 ?????? ?????? ??????? ???? ?????????, ???. (UpToDate, Inc.) ??? ?? ???? ????? ??????? ???/??? ????????? ???? ??? ?????? ???  ????????   © 2024 [employer] ???/??? ???? ??????? All rights reserved.    Patient Education     ???? 2 ????????   ????? ???????   UpToDate ???? ??????? ??? ???????? ????? ?????   ???? 2 ???????? ?? ??? -- ???? 2 ???????? ??? ????? ?? ?? ???? ????? ???? ???? ??? ?? ????? ???? ?????? ???? ???? ??? ?? ??? ?? ??? ???? 2 ????????? ?????? ???? ????? ???  ???? ?? ???? ?????? ??? ?????? ??? ??? ??? ??? ?? ???? ?? ??? ????? ??? ???? ??????? ???? ?????? ?? ??? ??? ?????? ???? ???? ????? ??? ??????? ??? ??? ????? ??? ???, ???? ?????? ????? ????? ????? ??? ?? ??????? ??????? ???? ??, ??? ?? ???? ??????? ????? ?????????? ???? ??? ?? ????? ??, ??? ???? ??? ???? ????? ?? ????? ??? ???????? ???? ????? ??? ????? ?? ????? ???  ???????? ???? 2 ???-??? ???? ??:   ???? 1 ???????? - ???? 1 ???????? ????, ???? ?????? ??????? ???? ??????? ?? ??? ???? ??? ??????? ??????? ???   ???? 2 ???????? - ???? 2 ???????? ???? ??????? ????? ????, ???? ?? ??? '?? ??????? ????? ?????????? ???? ??? ?? ????? ??? ???, ???? ?? ???, ???? ?????? ????? ??????? ?????? ??? ?? ????? ???  ???? ?? ???? ????? ??? ??? ?????? ??? ??? ?????? ??? ???? 2 ???????? ??? ?? ???? ??? ????? ??? ?? ???? ????? ??? ?? ??? ???? ????? ??? ?? ???????? ?? ???? ???  ???? 2 ???????? ?? ???? ?? ??? -- ???? 2 ???????? ?? ?? ??? '?? ??? ???? ???? ????? ??? ???? ???? ????? ??, ??? ????? ???? ?? ???? ????? ??:   ???? ????? ??? ?? ??? ????   ???? ????? ????   ?????? ???  ?? ???????? ??? ???? ????????? ?? ???? ?? ???? ??? -- ????  "???? 2 ???????? ???? ???? ??????? ????? ?????? ?? ????? ?? ???? ???? ??????? ???? ???? ????? ??, ??? ?? ???? ?? ??? ????? ????????? ???? ?? ???? ??, ????? ??:   ??? ?? ????   ?????? (????? ?? ????)   ????? ?? ??????   ??? ???? ????????? (??? ???????? ??)   ????? ??? ????? ???? ??? ??? ??? ?? ?????? ?? ????   ??????, ????? ???? ??????, ??? ???? ?? ??? ????? ??? ????? ?? ??? (??? ?????)  ????? ????? ??? ?????? ?? ????? ???? 2 ???????? ??? -- ?? ??? ????? ?? ?? ?? ??????? ???? 2 ???????? ??, ?????? ????? ??? ??? ??? ?? ???? ?? ???? ??? 2 ???? ?? ?? ???? ?? ???? ?? ???? ??? ????? ?????? ??? ???? ???? ?? ????? ??? ????? ???? ???? ??, ?????? ?????? \"???? ?????\" ??? \"???? ??????\" ???? ????? ??:   ????? ?????? ??? ???? ??? ?? ????? ??? ?? ???? ?????? ???? ????? ??? ????? ??? ?? ???? ????? ???? ????? ??? ????? ???? ??? ????-??? 8 ???? ?????? ???? ??? ????? ??? ?? ?? ??? ?? ???? ???? ???   ???? ???? ???????? ?? ?? ????? 2 ??? 3 ??????? ??? ?????? ??? ???? ????? ????? ??? ????? ??? ?? ??? ?? ???? ??? \"?????????? A1C\" ??? ???? \"A1C\" ???? ????? ??? ???? ??? ?? ???? ?? ???? ???? ???? ?? ???? ??, ????? ????? ????-???? ???? ???? ?????  ???? 2 ???????? ?? ???? ????? ???? ????? ??? -- ???? ?? ???? ??, ?????? ???? ????? ?? ??????? ???? ??? ???????? ???? ????? ???? ?? ??? ?????? ????? ???? ????????? ?? ???? ??? ??? ?????  ?????? ???? ???? ?? ???? ?? ???? ??:   ????????? ???? ???????? - ?? ???????? ?? ??????? ?? ??? ????????? ????? ??? ?? ???? ??????? ???? ???? ??? ????? ????? ??????? ???? ???? ???   ?????? - ??? ?? ?????? ?? ?? ???? ????? ??? ????? ???? ??? ?????? ??? ??? ????? ??? ??????? ?????? ??? ?? ??? ????? ?? ?? ????? ??????? ????? ???? ???? ?? ??? ?????? ?? ??? ?? ??????? ?? ???? ??? ??? ???? ??? ?????? ??? ?????? ??? ??????? ?? ?????? ?? ??? ????? ???  ????? ??????? ?????? ????? ?? ?? '?? ????? ???? ???, ??????? ?? ???? ?????? ??? '?? ???? ???? ????? ?? ???? ??? ?? ??? ?? ???? ??? ?? ???? 2 ???????? ???? ???? ?????? ??? ????? " "??? ?? ??? ???? ?????? ?????? ????? ??? ??? ??????? ?????? ?? ?? ??????? ???? ???? ????? ?? ???? ???? ?????? ??, ??? ?? ???? ??? ????? ???? ???  ?? ???, ???? 2 ???????? ???? ????? ??? ?????? ???? ??? ?????? ????????? ??? ???? ???? ??? ?? ?????? ?? ?? ??? ????? ??? ?????? ??, ???? ?????? ??? ??? ??? ?? ?????? ??? ?????? ?????? ??? ?? ???? ??? ?????? ?? ??????? ??? ??? ?????? ???   ?? ?????? ?????? - ?????? ???? ?? ??? ?????? ?? ???? ????? ?? ????????? ??? ?? ???? ?????? ???? ?????? ??? ?????????? ?? ?????? '?? ???? ????? ???? ??? ??????? ??? ???????? ?? ????????? ???????? ??, ????? ?? ??? ??? ?????????? ?????? 2019 (\"COVID-19\") ??? ????? ?? ????????? ??? ????? ??? ?????? ?? ?????? ?? ?? ?????? ?? ??? ????? ???  ?? ???? 2 ???????? ??? ????? ?? ???? ??? -- ???? ??????? ???? 2 ???????? ??? ???? ?????????? ??? ????? ??, ?? ??? ????????? ??? ?? ????? ?? ???? ?? ?? ?? ??? ??????? ?????? ???? ??? ???? ???? ????? ??????? ????? ???? ?????? ??? ???? ????? ?? ??? ?? ??? ??????? ??? ????? ???? ??? ??? ???? ??? ?? ???? ???? ???? ??? ???????? ????? ?????? ?????? ???? ?? ?? ???? ??? ????? ??? ?? ??? ??? ???? ????-????? ??????? ?? ?? ??? ????? ???  ???? ????? ????? ????? ??, ??? ???? ??? ?? ?????? ???? 2 ???????? ?? ???? ??? ????? ?? ???? ??? ????????? ????? ????? ?? ???? ??, ?? ?????? ????? ??? ??? ??? ?? ???? ???  ?? ???? ???? ????? ?? ????? ?? ??? ???? ?????? ????? ?????? ?? ???????? ???? ?? ????? ?? ??? ???? ????? ?????? ?? ????? ????? ???  ?? ????? UpToDate ??? ??? ??? ????: Apr 24, 2024.  ????? 62472 ?????? 19.0.pa.1  Release: 32.3.2 - C32.113  © 2024 [facility] ???/??? ???? ??????? All rights reserved.  ??????? ??????? ????? ??? ???????   ??????? ??????? ????? ??? ???????: ?? ????? ??????? ????, ????, ???/??? ?????? ?? ??????? ?? ???? ??? ??? ?? ?????? ????? ?? ???? ?? ??? ?? ??? ??????? ??? ?????? ???? ??? ???? ?? ??????? ??? ???? ???/??? ??????? ??? ???? ??? ??? ?? ??? ???? ???? ????? ???? ?????? ??? ?? ???? ??????, " "??????, ??????, ???? ???????? ??? ?????? ???? ???? ??????? ????? ???? ?? ?? ???? ??? ????? '?? ???? ?? ???? ????? ?? ???? ???? ???? ????????? ?? ???? ??? ????? ???? ??? ??? ?????? ?????? ?? ??????? ?? ???? '?? ?????? ???? ??? ???? ???? ???? ????????? ?? ?????? ????, ???? ??? ???? ?? ??? ????? ?? ???? ??? ??????? ??? ???? ????, ?????? ??????, ??? ???? ?? ??????? ???? ???? ??????? ??? ?? ??? ???? ????? ??????? ??? ??? ???? ?????? ??, ??? ???? ?????? ?? ????? ?????? ??? ?? ???? ??? ??? ????? ???? ?? ??????? ???? ?? ???? ??? ?????? ?? ????????, ?????, ??? ???? ??? ???? ?? ???? ??? ?? ????????? ??? ???? ????? ???? ???? ??? ????????, ???. ??? ???? ??????? ?? ??????? ??? ???? ????? ??? ????? ???? ?? ?????? ??? ??????? ??? ????? ???? ????? ??????? ?? ????? ??? ????? ???? ????? ?????? ??????? ???? ????? ??, ?? ?? ???? ????? ?? [email or web address]/know/clinical-effectiveness-terms ???????? © 2024 ?????? ?????? ??????? ???? ?????????, ???. (UpToDate, Inc.) ??? ?? ???? ????? ??????? ???/??? ????????? ???? ??? ?????? ???  ????????   © 2024 [employer] Inc. ???/??? ???? ??????? All rights reserved.    Patient Education     ???????? ???? ?????? ?? ????????????? ?? ?????   ????? ???????   UpToDate ???? ??????? ??? ???????? ????? ?????   ??????????????? ?? ????? ?? ??? -- ????????????? ?? ????? ???? ?? ????????? ?? ??? ???? ?? ?????? ???????? ???? ???? ???? ??? ????? ??? ?? ????? ?? ?? ??? ????? ?? ??? ????? ?? ?? ?? ????? \"?????????????\" ????? ??? \"?????????????\" ?? ????? ????? ???? ??? \"????\" ???  ???? ???? ???? ????? ???? ????? ???? ??? 3 ???? ?????? ?? ??????? ????? ???? ????? ??: ?????????????, ??????? ??? ???? ????????????? ???? ??? ????? ????? ?? ?? ???? ??? ????? ??? ???? ???? ?? ????????????? ?? ????? ???? ???  ????? ??????????????? ?? ????? ???? ???? ?????? ??? -- ???????? ???? ????? ??? ???? ???? ?????? ?? ?? ?? ????? ????????????? ????? ??? ?? ?? ?? ?? ?????? ????????????? ?????? ???? ????? ?? ???? ??? ??????? " ???  ????????????? ?? ????? ?????? ?? ???? ??? ???? ??:   ???? ??? ????? ??? ?????? ??? ?? ??????? ?? ??? ????? ?? ??? ???? - ???? ????? ???? ??? ?????? ??????? ????? ??, ??? ???? ??? ???? ????? ?? ?????? '?? ????? ???? ??, ??? ???? ????????????? ?? ????? ?? ????? ?? ?? ?????? ??? ?? ????? ??? (?? ?? ??? '?? ?? ????? ???? ?? ?? ?????? ????? ???? ??? ?? ????? ?? ??? ?????? ???? ????? ?? ???? ?? ???)   ??? ?? ???? ???? ??? ?????? ?? ????? ???? - ????? ?? ??? ????? ?? ????????????? ?? ????? ?? ????? ?? ???? ?? ?? ??????? ???? ???? ??? ???? ???? ????? ????????????? ???? ?????? ??? ?? ?????? ?? ????? ????? ???? ??? ????? ?? ????? ???? ??? ???? ??? ????? ???? ???? ????? ???   ???? ???? ????? ?? ???? ??? ???????? ???? ????? - ?????? ????? ???? ??? ???? ???? ??? ???? ????????????? ??? ?????? ??? ?????? ???? ????? ??? ???? ?????? ??? ??? ????? ???? ??? ??? ???? ??? ?? ????? ??????? ??? ????????? ?? ??? ??? ???? ????? ?? ?? ??? ???? ????? ?? ???? ?? ???? ??, ??? ?? ???? ?? ???? ???? ????????????? ?? ???? ????? ???? ?? ???? ????? ??? ???? ??? ??? ??? ????? ???? ??? ???? ??? ?????? ????? ???? ??? ???? ????????????? ?? ????? ??? ????? ??? ?????? ????????? ??? ????? ??? ??? ????????? ??? ???? ??? ??? ???? ?? ??? ???????? ???? ?? ??? ?????? ?????? ????????? ??? ???? ???? ??? ??? ???? ???  ?????? ?????, ???, ??? ?????????? (???? ????) ?? ??? ????? ???? ?????? ??? ?? ???? ?? ?? ?? ???? ????? ????????????? ??? ?? ??????? ???? ??? ?? ???????? ???-??? ???? ?????, ???, ??????? ?? ????, ??? ????? ???????? ???? ??????? ?????? ????? ??, '?? ????? ??????  ???? ??? ?????? ??????? ??? ???? ????? ??? ?? ???? ??? ???? ???? ????????????? ?? ????? ??? ???? ???? ?????? ????? ?????? ??? ?? ?? ?? ?? ??? ?? ?? ????-?? ??? ??????? ?? ??? ????? ?? ???? ???? ??????? ?? ????? ????????????? ?? ????? ??? ??? ???? ?????, ??? ????? ?? ???? ????? ???? ??? ?? ???? ??? ???? ??? ???? ?????? ??? ?????? ?? ???? ?? ????? ??? ?? ?? ???? ????????????? ?? ???? ????  "????? ?????? ???? ???  ????? ???? ???? ????????????? ????? ??? -- ???? ????? ????????????? ???? ?????? ???? ????? ??:   ???? - ????? ???? ?????, ?????, ???? ??? ?????? ????? ???   ?? ??? ????? ?????? ??????? - ????? ?????? ??????? ???? ???, ???? ??? ?????? ???? ???   ???? ??? ??? ????? ????? - ????? ??????? ???? ??? ??? ???? ????? ???   ???? ???? ???? - ????? ???? ??????? ??? ???? ????? ????? ?? ?????? ??? ???, ??? ?? ??? ??? ???? ???? ????? ??? ???? ????? ????? ???  ???? ???????? ????????????? ????, ???????, ?????? ???? (????? ?? ??? ?? ?????, ?????? ???? ???? ??????, ??? ?????? ????), ??? ??? ??? ???? ???? ??? ????? ??????? ??? ?????? ???? ?? ??? ???? ????? ???  ??? ????????????? ?? ????? ????? ????? -- ??? ???? ?????? ???? ????????????? ?? ????? ??? ??, ??????? ???? ?? ????? ???? ?? ???? ??? ?? ??? ????? ?? (???? ?? '?? ???? ??)?  ???? '?? (????? 1), ????? ?????? ?? ???? ???:   \"???? ?????????????\" ???? - ?? ??????? ????? ?? ?? ???? ?? 1 ?????? ???? ???? ????? ????????????? ??? ???? ????? 1 ?????? ????? ??, ??? ?????? ????? ???? ??? ???? ????????????? ?? ????? ???? ????????????? ?? ????? ?? ????? ???   \"?????? ?? ????\" - ?? ??????? ????? ?? ?? 1 ?????? ???? ????? ???? ??? ?? ????? ??? 2 ???????? ????? ??, ??? ????????????? ?? ????? ??????? ????????????? ?? ????? ??? 2 ???? ???????   \"?????? ?????\" - ????? ??? ????????????? ?? ?? ???? ???? ?????, ????? ???? ?? ?? ?? ???? ????? ??? ???? ???????? ???? ????? ????? ???? ???? ?????? ???? ????? ??? ??????? ??? ???? ??? ?? ???? ??? ?? ???? ???? ???? 5 ????? (g) ??? ??? ????? ??, ?? ????? ?? ???? ????? ?? ??? ??????? ??? ??????? ?? ??? ????? ??? ?? ??, ?? ????? ??????? ?? ????? ?? ???? ?? ??? ??, ??? ??????? ???? ????? ????????????? ?? ????? ???? ?????? ?? ?? ????? ??? ???? ?? (????? 1)?  ??????? ????? ?? ??? -- ??????? ?????????, ??? \"??????? ?????,\" ????? ?? ???? ????? ????? ???? ???? ?? ????? ????? ?? ????? ??? ?? ?????? ?? ???? ?? ?????? ???? ???? ?????? '?? ????? " "???? ???? ????? ???  ??????? ????? ???? ?? ????? ????? ????? ?? ?? ???-??? ?????? ?? ????? ????? ???? ???? 15 ????? ????????????? ????? ?? (????? 1 ??? ????? 2 ??? ????? 3)? ?????? ?????, ???, ??? ?????????? (????? ????) ??????? ???? ???? ??? ???? ?? ??? ??? ?? \"???? ???????\" ?? ??? ??????? ????? ????? ?? (????? 4)? ???? \"???\" ???? ?? ?? ????? ?? ??? ???? ???? 15 ????? ????????????? ????? ??? ???? ????? ???? ???? ??? ????? ?? ????? ??????? ?? ??? ?????? ??????? ??? ???? ??? 1 ????????????? ????? ??? ???? ??? \"?????\" ???? ?? ??????? ?????? ??, 1 ???? ??? ??? 1/3 ??? ????? ??? ????? ?? ???? ???  ??? ???? ???? ?? ????? ????? ??? ????/???? ???? -- ??????, ????? ???? ?? ????? ????? ?? ?? ??????? ?? ???? ????? ????????????? ???? ?????? ??? ???? ????? ????? ???? ?? ??? ???? ?????, ???, ??? ?????????? ??? ??????  ???? ??? ????? ?? ?? ??? ?? ???? ??:   ???? ????????????? ??? ?? ???? 3 ???? ?????? ?? ???? 4 ??? 6 ???? ?????? ???? ?????   ???? ???? ???? ????????????? ?? ???? ???? ????? ???, ?????? ??, ???? ???? ????   ???? ???? ?? ???? ???? ???? '?? ???   ???? ?????? ?? ???? ??? ?????? ????? ????   \"???? ????\" ?? ????? ???? ?? ?? ??? ??? ????? ????? ?? ??? ??? ????? ?? ?? ??????? ?? ???? ?? ??? ????????????? ??? ??? ??????? ????? ?? ???? ?????? ????? ?? ?????? ???? ????????????? ?? ????? ??? ?? ?????? ????? ???? ????? ??, ?? ?? ??? ????? ????? ??? ??? ??? ???? ?? ?? ?? ???? ?????? ???? ??? ?? ????? ?????? ??? ?????? ??????? ????? ??, ??? ???? ???? ?? ????? ??? ?? ???? ?????? ????? ????????????? ??? ?? ????? ??, ?? ??? ?? ???? ??????? ?? ????? ??? ?????? ???? ????? ???  ???? ???? ??, ????? ???? 9 ??? (23 ?????????) ???? ?? ??? ???? ??? ????? ???? ??? ??? ?? ??? ????? ??? ??? (????? 2):   1/2 ???-?????? ???????   1/4 ???????   1/4 ?????????????   ???? ???? ????? ?? ???? ????? ??? ???? ???? ??, ???? ?? ???? ????? ???? ???????? ?? ????? ???? ?? ?????? ?? ???? ???? ??? ?? ???? ?? ?? ??? ??? ???? ?????? ???? ????? ??? ????? ???????? " "???? ???   ???? ???? ??? ???? ????? ?? ?????? '?? ???? ????? ?? ???? ????? ??? ??? ??? ????? ??? ?? ??? ??? '?? ?? ?????? ???? ??? ??????? ?? ???? ?? ????? ??????? ????? ??, ??? ??????? ???? ???? ?? ????? ??? ???? ????? ??? ????? ????-?? ??? ????? ??????? ????? ??, ?? ?? ?? ???? ????? ??????   ?? ????? ??????? ????? ??, ??? ????? ???? ?? ????? ????? ?? ?? ?? ??? ????? ????? ??? ?? ???? ?? ????? ????? ?? ?? ?????? ???? ????? ?? ???? ?? ???? '?? ????? ??? ????? ??????? ???? ?? ??? ?????? ????? ????????????? ??? ?? ????? ???   ??? ???? ?? ????????????? ??? ????? ??? ?????? ?????? ???? ????? ?? ???? ??? ??? ??? ??? ?????? ??? ????? ?????? ???? ????, ????? ????, ????? ????, ????? ???? ??? ???? ????? ?? ???? ??? ?? ????? ??????? ????? ??, ??? ????? ???? ?? ????? ????? ?? ?? ????? ??????? ???? ???? ????? ??? ????? ??? ???? ??????? ???? ???  ?? ??????? ????????????? ?? ????? ??? ??? ???? ???? ????? ??? ??????? ???? ???? ???? ?????? ? ??? ??, ??? ???? ????? ??? ??? ??? ??? ???? ?? ??? ?? ???? ??? ??? ?????????? (????? ????) ????? ??? ???? ?? ????? ????? ???? ?? ?????? ??? ?? ???? ?? ?? ??????? ?? ???? ????????????? ?? ??? ????? ?????? ??????  ????? ??????? ??, ????? ????????????? ?? ????? ???? ??? ????? ?? ?????? ?? ???? ?? ??? ?????? ????????? ????????? ?? ???????? '?? ?? ???? ?? ([email or web address])?  ?? ???? ???? ????? ?? ????? ?? ??? ???? ?????? ????? ?????? ?? ???????? ???? ?? ????? ?? ??? ???? ????? ?????? ?? ????? ????? ???  ?? ????? UpToDate ??? ??? ??? ????: Mar 27, 2024.  ????? 45435 ?????? [email or web address].[email or web address]  Release: 32.2.4 - C32.85  © 2024 [employer] ???/??? ???? ??????? All rights reserved.  ????? 1: ????????????? ??? ?????     1 ?????? ???? \"????????????? ?? ?????\" ?? ??? ????? ??, ???? ????????????? ?? ????? ?? ????? (46 ?????) ??? ????? ???, ??? ????? ????? ?? ????? ?? ????? (7 ?????) ??? ????? ?????? ?? ???? ??? ???? ???? ?? ????????? ??? ?? ?????? ????, ????????????? ?? ????? 39 ????? ??? ????? ???? ??????? ?? " "????? ?? ????????????? ?? ????? ???? ???? ?? ???? ?? ????? ?? ???? ???  [employer] 19097 Version 8.0  ????? 1: 15 ????? ????????????? ???? ????? ??? ????*  ?????    ????  ?????? ?? ????    ???? 1/4 ???? ???? (1 ???)   ?????? 1 ?????? (2.5 ??? ??-???)   ?????, ??? ??????, ???? 2 ????? (1.5 ???)   ????????? 1.75 ??? ???? (1.5 ???)   ?????? ????? 1/2 ?????   ??? ??? ??? ??????? ?? 1/2 ?? (3/4 ???)   ???, ????? ??? ???? 1 ???   ?????? 1 ?????? (4 ??? ??-???, 1/4 ??? ????)   ???? (6 ??? ??-???) 1/2 ????   ???????, ???? 1 ???? ??????? (6 ??? ????)   ???????, ??? (????? ??? ?????? ???) 1 ???? ??????? (6 ??? ????) ??? 1/3 ???? ??????? (10 ??? ????)   ????? 1 ???? (4-??? ??? ??? 4 ??? ??-???)   ?????? ??? ???? (????? ??? ???? ????)    ????  ?????? ???? (?????)    ???, ??????, ?????, ????? (????? ??? ?????? ???, ???? ???? ??? ???), ???????, ?????? (???? ???), ??? ???? (?????, ??????, ??? ??? ??? ??? ??????) 1/3 ???   ????? ?????? (?????, ???, ??? ????), ???? ??? ??? (????), ??? ????-????? ?????? 1/2 ???   ?????, ?????, ?????? (?????), ??? ????? ???? 1/2 ???   ???????? ?????? 1/4 ???   ??? ?????? (???, ?????, ??????) 1/2 ???   ????? ????? ?????, ??? ?? ???? ?????? 3/4 ???   * ????? ??? ???? ??, 15 ????? ????????????? ??? ????? ????? ?? 1 ?????? ??? \"???\" ????? ????? ?? ??????? ??? ????????????? ?? ????? ??? ?? ??? ????? ???  Graphic 671788 Version 1.0  ????? 2: 15 ????? ????????????? ???? ??*  ????  ?????? ?? ????    ??? ???, ????? ????? ????? 1/2 ???   ???? 1 ???? ???? ????, ???? 4 ??? ???? (4 ???)   ??????? 3/4 ???   ????? ?? (???????, ????, ????????, ?????? ??, ????) 2 ???   ??, ??????? 1/2 ???   ??, ????, ???? (???) 1 ???? ?? (4 ???)   ??, ????, ??????? (???????, ?????, ????????, ???????) 1 ??????? ?? (6 ???)   ???? ?? ???, ????? ????? ????? 1/2 ???   ????? 17 ???? ????? (3 ???)   ?????, ????? ???? 1 ???   ?????????, ???? 1 ??? 1/4 ???   ???? ??????? ???? ??? ????, ??? (???) ???? ????? ??? ??? ????? ????? ??? ?? ????? ????? " "???? ?? ?? ?????? ?? 1 ?????? ?? ??? ???? ?? ?? ??? ????, ??? ????? ??? ?? ???? ??? ?? ??? ???? ?? ????? ?? ???? ???  * ???? ??, 15 ????? ????????????? ??? ????? ????? ?? 1 ?????? ??? \"???\" ????? ????? ?? ??????? ??? ????????????? ?? ????? ??? ?? ??? ????? ???  Graphic 198250 Version 1.0  ????? 3: 15 ????? ????????????? ?????? ????? ??????? ???????*  ????  ?????? ???? (?????)    ?????, ?????, ??? ???? 1/3 ???   ????, ??? ???, ????? ???????, ??? ???????? 1/2 ???   ???????, ?????, ??? ??????? ??? 1/2 ???   ????? ??????? (???? ??? ??? ?? ???) 1 ???   ???, ????? ???, ????? ???? 1/4 ???? (3 ???)   ???, ??????-?????? (??? ???? ??? ????) 1 ??? (2 ???)   ???, ???? ??? ??? ??? ???? ??? 1/2 ???   ??????, ?????? (?????, ?????) 1 ???   ????? ??? ????? ???, ???? 1/2 ??? (3 ??? 1/2 ???)   ?? ????? ????? ???? ?? ?? ?????? ???? ?? ???? 1 ?????? ?? ??? ?? ??? ????, ??? ????? ??? ?? ???? ??? ?? ??? ???? ?? ????? ?? ???? ???  * ????? ?????? ?????? ??, 15 ????? ????????????? ??? ????? ????? ?? 1 ?????? ??? \"???\" ????? ????? ?? ??????? ??? ????????????? ?? ????? ??? ?? ??? ????? ???  Graphic [id number] Version 1.0  ????? 4: ????? ??????? ????? ???? ?????  ????  ??????? ?????  ????? ????  ????????????? ?? ????? (?????)    ????? 8 ??? 3 ????????????? ????    2 ????? 1 ?????? ????? 30    1 ?? 1 1/4 ??? ????????? 15    1 ??????? ???? 1/4 ??? ????? ???? -    1 ??? ???? 1 ??? ??????? -      ????: 45    ?????? 12 ??? 4 ????????????? ????    2 ????? 2 ????? ????? 30    1 ?? 1 ????? 15    1 ????? 1 ??? ???? -    1 ???? 8 ??? ???? ???? 12    3 ??????? ???? 3 ??? ???? -    1 ??? ???? 1 ???? ??? ??? ??? ???? -      ????: 57    ?????? ???? 3 ??? 1 ????????????? ????    1 ?? ??? 1 ????? 1 ??? ??? 6 ?????? 15      ????: 15    ??? 6 ??? 4 ????????????? ????    2 ????? 1 ??? ??? 30    1 ?? 1/2 ??? ?? ???? 15    1 ????? 1 ??? ???? -    1 ???? 8 ??? ???? ???? 12    6 ??????? ???? 6 ??? ???? -    1 ??? ???? 2 ??? ??? ??? ???? ???? ??????? -      ????: 57  " "  ??? 9 ??? 1 ????????????? ????    1 ????? 6 ?????? 15    1 ??????? 2 ???? ??? ??????? ?? ???? -      ????: 15    Graphic [id number] Version 3.0  ????? 2: \"???? ????\"     ???? ???? ??, ????? ???? 9 ??? (23 ?????????) ???? ?? ??? ???? ??? ????? ???? ??? ??? ?? ??? 1/2 ???-?????? ???????, 1/4 ???????, ??? 1/4 ????????????? ??? ????  Graphic [id number] Version 2.0  ??????? ??????? ????? ??? ???????   ??????? ??????? ????? ??? ???????: ?? ????? ??????? ????, ????, ???/??? ?????? ?? ??????? ?? ???? ??? ??? ?? ?????? ????? ?? ???? ?? ??? ?? ??? ??????? ??? ?????? ???? ??? ???? ?? ??????? ??? ???? ???/??? ??????? ??? ???? ??? ??? ?? ??? ???? ???? ????? ???? ?????? ??? ?? ???? ??????, ??????, ??????, ???? ???????? ??? ?????? ???? ???? ??????? ????? ???? ?? ?? ???? ??? ????? '?? ???? ?? ???? ????? ?? ???? ???? ???? ????????? ?? ???? ??? ????? ???? ??? ??? ?????? ?????? ?? ??????? ?? ???? '?? ?????? ???? ??? ???? ???? ???? ????????? ?? ?????? ????, ???? ??? ???? ?? ??? ????? ?? ???? ??? ??????? ??? ???? ????, ?????? ??????, ??? ???? ?? ??????? ???? ???? ??????? ??? ?? ??? ???? ????? ??????? ??? ??? ???? ?????? ??, ??? ???? ?????? ?? ????? ?????? ??? ?? ???? ??? ??? ????? ???? ?? ??????? ???? ?? ???? ??? ?????? ?? ????????, ?????, ??? ???? ??? ???? ?? ???? ??? ?? ????????? ??? ???? ????? ???? ???? ??? ????????, ???. ??? ???? ??????? ?? ??????? ??? ???? ????? ??? ????? ???? ?? ?????? ??? ??????? ??? ????? ???? ????? ??????? ?? ????? ??? ????? ???? ????? ?????? ??????? ???? ????? ??, ?? ?? ???? ????? ?? [email or web address] ???????? © 2024 ?????? ?????? ??????? ???? ?????????, ???. (UpToDate[employer] ??? ?? ???? ????? ??????? ???/??? ????????? ???? ??? ?????? ???  ????????   © 2024 [employer]ToDate[employer] ???/??? ???? ??????? All rights reserved.    Patient Education     DASH ?????   ????? ???????   UpToDate ???? ??????? ??? ???????? ????? ?????   DASH ????? ?? ??? -- DASH ?? ??? ?? \"dietary approaches " "to stop hypertension\" (?????????? ??? ???? ?? ?????? ?????)? ?? ??? ???? ????? ?? ?? ???? ??????? ??? ??? ??? ???? ??? ?? ???? ??? ?? ??? ???? ??????? ??? ???? ???? ?? ??? ?? ???? ??, ??? ??? ????? \"???????????\" ?????? ??? DASH ????? ?? ????? ??? ?? ??????? ??? ???? ??? ??????? ?? ??? ???? ??? ?? ??? ????? ???? ??? ???? ?? ???? ??? ???? ????? ???  DASH ????? ???? ???? ???? ?? ??? ???????, ?????? ????, ??? ??? ???? ???, ??????? ???, ??? ??? ??? ???? ??? ???-???? ????? ????? ???? ????? ?? (????? 1). ?? ???? ????????? ???, ?????? ???, ??????????, ????? ?? ????, ??? ?????? (???) ?? ????? ??? ????? ???  ????? DASH ????? ?????? ??? ????? ??? 2300 ????????? ??? ????? ???? ??? ?????? ????? ??? ??? ?????? ??? ?? ???? ??? ?? ???? ?? ?? ?????? ??? ???? ?? ???? ?????? ???  ????? DASH ????? ?? ??? ???? ??? -- DASH ????? ?????? ?? ???? ??? ?? ???? ??:   ???? ???? ??????? ??? ?????????? ??? ??? ????   ?????, ??? ?? ??????, ??? ?? ???? ??? ?????? ?? ???? ???? ??? ??????? ?? ?????? ??? ?? ??????, ????? ?? ?????, ??? ???????? ?? ???? ??? ?? ??? ???? ???   ??? ?????? ??? ??????? ???? ?????  ??? DASH ????? '?? ?? ??-?? ????/???? ???? -- ????? ?????? ??????? ??? ????????? ????? ?????? ?? ??? ?????-??????? ??? ???????? ??? ?? 2000-??????-?????-??? ??? ?? ????? '?? ?????? ???  ??????? ????:   ???? - ?? ???? 6 ??? 8 ???????? ?????? ????, ??? ????? ???? ???? ??? ?? ??????? ???? ?????? ???? ???????? ???? 1 ????? ?? ?????, 1 ??? (30 ?????) ????? ??????, ??? 1/2 ??? (120 ?????) ???? ??????, ?????, ??? ?????? ???? ????? ???   ?? - ?? ???? ???? ???? 4 ??? 5 ???????? ??? ?? ??????? ???? ???????? ???? ???????? ???? 1 ??????? ?? ??? 1/2 ??? (75 ?????) ?????, ??????, ??? ??????? ?? ????? ??? ???-??? ?????? ??? ????? ?? ???? ??? ?? ??????? ???? ?????? ??? ??????? ?? ???? ????? ?? ???? ???? ???? ??????? 100 ???????? ???? ?? ??? ??? ???? ??? ?????? ??? ??????? ???? ?? ??? ????   ??????? - ?? ???? 4 ??? 5 ???????? ??????? ??? ?? ??????? ???? ???????? " "???? ???????? ???? 1 ??? (40 ?????) ??????? ??? ??? 1/2 ??? (75 ?????) ??????? ??? ?????? ??????? ????? ??? ?? ?????? ??? ????? ??? ???? ?? ??????? ???? ?? ????? ??????? ??????? ?????? ??, ??? \"??? ??????\" ??? \"??? ????\" ????? ???? ??? ??? ?????? ??? ??? ?? ??????, ?????? ??????? ??????   ????? - ?? ???? ???-???? ??? ??? ??? ???? ???? ??????? ???? 2 ??? 3 ???????? ??? ?? ??????? ???? ???????? ???? ???????? ???? 1 ??? (240 mL) ???? ??? ???? ??? 1.5 ??? (45 ?????) ???? ????? ???   ??? ??? ???? ???, ??????, ??? ??????? ???? - ?? ???? ??? ??? ???? ???, ??????, ??? ??????? ???? ???? 6 ??? ??? ???????? ??? ?? ??????? ???? ???????? ???? ???????? ???? 1 ???? ??? 1 ??? (30 ?????) ????? ???? ???, ??????, ??? ???? ????? ??? ????, ???? ??? ???? ???? ??? ??? ???? ??? ?? ??? ??? ?? ??????? ???? ??? ??? ??? ????   ??? ??? ??? - ?? ???? ??? ??? ??? ???? 2 ??? 3 ???????? ??? ?? ??????? ???? ???????? ???? ???????? ???? ????? ?? 1 ??? (5 mL) ??? ??????? ??? ?????? ???, ??? 1 ??? (18 ?????) ???????? ??????? ??? ??? ????? ?? ?? ????, ?????? ??? ??????? ???? ??? ????? ??? ????? ?? ??? (????? ???) ??? ?????? ??? ????? ?? ?????? ??? ???? ?? ??????? ???? ????? ????, ??????, ???????? ??? ??????? ?? ??? ??? ?? ????? ???? ??? ??? ?????? ??? ??? ??? ???? ???? ??????? ??? ??????? ?? ????? ????  ????????? ????:   ??????, ???, ??? ????? (??????? ????? ??? ???) - ?? ????? 4 ??? 5 ???????? ??? ?? ??????? ???? ???????? ???? ???????? ???? 1/3 ??? (45 ?????) ??????, 2 ??? (50 ?????) ?????? ?? ???? ??? ???, ??? 1/2 ??? (75 ?????) ?????? ???? ????? ????? ??? ???? ??? ?????, ???????? ?? ???, ??????? ??? ??? ?????? ?? ????, ???????, ???, ??????, ??? ????? ??? ???????   ??????? - ?? ????? 5 ???????? ??? ??? ??? ?? ??????? ???? ???????? ???? ???????? ???? 1 ??? (14 ?????) ???? ??? ????, ??? 1/2 ??? (120 ?????) ??????? ????? ??? ??? ??? ???? ??? ?????? ???-???? ????? ??????? ?? ??? ???? ????? ???? ???? ?? ????? ???? ???????, ?????, ???????, ?????? ??????, " ??????? ?? ??????, ??? ??? ???? ????? ???, ??? ????? ???? ????? ??? ??????? ?? ???? ???? ??? ?? ?? ????  ???? ???? ??? ??, ????, ???-??????, ?????, ?????, ??? ????? ?? ?? ??? ???? ?? ????? ???? ???? ?? ????? ?? ???? ?? ??? ?????? ??? ???-???? ????? ????? ?? ??? ??? '?? ?????, ???, ??? ???? ??? ???? ?????? ?? ????????? ???  DASH ????? ???? ????? ????? ???? ??? ??? ???? ??????? ??? ?????? ???? ?????? ???    ???? ??????? ??? ?????? ???? ?????? ?? - ????? ??????, ???, ?????, ????? ?????, ???-???? ???, ?????? ????, ?????? ?????-????, ????? ??? ???? ??? ???? ??????, ????? ??? '?? ???? ????, ?????, ?????? ?????   ?? ??? ??????? ??????? ??? ?????? ???? ?????? ?? - ???? ??? ????? ???? ??? ??? ??????? ?? ??????, ?????? ??????? ??????? ??? ???, ??? ??? ?????? ??????, ???? ?????? ???????, ??? ??? ?????? ???? ??????? ???? ???   ????? ????? ??????? ??? ?????? ???? ?????? ?? - ?????? ????, ?????? ????, ?????? ????, ???, ???? ??? ???? ????, ??? ??????   ??? ??????? ??? ?????? ???? ?????? ?? - ????? (????? '?? ????), ???? ????, ?????, ??? ??????? ???? ????? ?? ??????? ??? ????????? ??? ?? ??? ??? ???? ??? ????? ???, ?????, ????, ???? ??? ?????, ??? ???? ?? ??? ?????   ??? ??? ??? ??????? ??? ?????? ???? ?????? ?? - ??? ??? ????? ????, ????, ??? ???? ???? ??????? ??? ???? ????????? ???, ?????? ??? ??? ???? ??? ??? ????   ????? ??? ????? ??????? ??? ?????? ???? ?????? ?? - ????? ??? ??????? ???, ?????, ??? ?????? ????? ???? ???? ????, ??? ??? ????, ????, ??? ????? ??? ???? ??????   ??????? ??????? ??? ?????? ???? ?????? ?? - ??? ??? ?????? ??? ?????? ???? ????? ?? ????, ????, ????????, ??? ????? ??? ?????? ?????? ????? ????   ?????? - ???? ????? ????? ??? ?? ??? ???? ??, ??? ????? ??? ????? ???? ????? ????? ????? ??? ??? ??? ???? 1 ?????? (????? ??) ??? ??? ??? ???? 2 ?????? (????? ??) ??? ????? ??? ?? ??????? ???? ???  ????? ??? ?? ???? ?? ??? ???    ?? ????? ?? ??? ?? ????? ????? ?? ?????? ????? ??????? ???? ??????? ?? ???? ???,  ????? ?? ???? ?????, ?????? ???? ?? ???? ???   ???? ??? ??? ?? ???? ????? ??? ?????? ??? ?? ??????? ????   ?? ???? ???? ?? ????? ????? ??? ?? ??? ??????? ?? ??? ????? ???? ??? ?????? ?? ????? ?? ????? ?? ???? ???   ???? ????? ?????? ?? ????? '?? ????? ??, ??? ??? ???? ??? ???? ????? ????? ???? ????? ????? ????? ?? ??? ???????? ?? ???, ?????? ?? ??????? ????? ???-??????? ???? ????? ??? ???? ???? ???   ???? ??? ???? ???? ????? (????? 2). ?? ??????? ???????? ?? ?? ??? ?????? ???? ????? ????? ??? ????? ?????? ?? ?? ???? ??????? ???? ????? ?? ???????? ???? ????? ????? ??? ???? ?????? ??????? ???? ?? ??? ??? ???? ?????? ??? ???? ???  ?? ???? ???? ????? ?? ????? ?? ??? ???? ?????? ????? ?????? ?? ???????? ???? ?? ????? ?? ??? ???? ????? ?????? ?? ????? ????? ???  ?? ????? UpToDate ??? ??? ??? ????: Feb 26, 2024.  ????? [id number] ?????? [email or web address].pa.1  Release: 32.2.4 - C32.56  © 2024 UpToDate, Inc. ???/??? ???? ??????? All rights reserved.  ????? 1: DASH ?????     Graphic [id number] Version 1.0  ????? 2: ???? ????     Graphic 468221 Version 1.0  ??????? ??????? ????? ??? ???????   ??????? ??????? ????? ??? ???????: ?? ????? ??????? ????, ????, ???/??? ?????? ?? ??????? ?? ???? ??? ??? ?? ?????? ????? ?? ???? ?? ??? ?? ??? ??????? ??? ?????? ???? ??? ???? ?? ??????? ??? ???? ???/??? ??????? ??? ???? ??? ??? ?? ??? ???? ???? ????? ???? ?????? ??? ?? ???? ??????, ??????, ??????, ???? ???????? ??? ?????? ???? ???? ??????? ????? ???? ?? ?? ???? ??? ????? '?? ???? ?? ???? ????? ?? ???? ???? ???? ????????? ?? ???? ??? ????? ???? ??? ??? ?????? ?????? ?? ??????? ?? ???? '?? ?????? ???? ??? ???? ???? ???? ????????? ?? ?????? ????, ???? ??? ???? ?? ??? ????? ?? ???? ??? ??????? ??? ???? ????, ?????? ??????, ??? ???? ?? ??????? ???? ???? ??????? ??? ?? ??? ???? ????? ??????? ??? ??? ???? ?????? ??, ??? ???? ?????? ?? ????? ?????? ??? ?? ???? ??? ??? ????? ???? ?? ??????? ???? ?? ???? ??? ?????? ?? ????????, ?????, ??? ???? ??? ???? ?? ???? ??? ??  ????????? ??? ???? ????? ???? ???? ??? ????????, ???. ??? ???? ??????? ?? ??????? ??? ???? ????? ??? ????? ???? ?? ?????? ??? ??????? ??? ????? ???? ????? ??????? ?? ????? ??? ????? ???? ????? ?????? ??????? ???? ????? ??, ?? ?? ???? ????? ?? [email or web address] ???????? © 2024 ?????? ?????? ??????? ???? ?????????, ???. (UpToDate, Inc.) ??? ?? ???? ????? ??????? ???/??? ????????? ???? ??? ?????? ???  ????????   © 2024 UpToDate, Inc. ???/??? ???? ??????? All rights reserved.

## 2025-07-09 NOTE — PROGRESS NOTES
Name: Trey Baeza      : 1965      MRN: [id number]  Encounter Provider: Dandre Taylor MD  Encounter Date: 2025   Encounter department: [facility]    Assessment & Plan  Type 2 diabetes mellitus with hyperglycemia, without long-term current use of insulin (HCC)    Lab Results   Component Value Date    HGBA1C 7.6 (A) 2025   HbA1c is uncontrolled.  Patient admits to dietary indiscretion and lack of physical activity.  Patient and son wants to defer up titration of medication.  They will work on lifestyle modification.  We extensively discussed dietary modifications including carb controlled diet and portion restriction, increased physical activity.  Follow-up with blood work in 3 months    Orders:    POCT hemoglobin A1c    Albumin / creatinine urine ratio    CBC and differential    Comprehensive metabolic panel    Mixed hyperlipidemia  Stable, continue current doses of statin  Orders:    Albumin / creatinine urine ratio    CBC and differential    Comprehensive metabolic panel    Lipid panel    Establishing care with new doctor, encounter for    Orders:    Albumin / creatinine urine ratio    CBC and differential    Comprehensive metabolic panel    Essential hypertension  Repeat blood pressure was elevated.  Son reports home BPs are stable, continue to monitor at home, encouraged DASH diet, continue current medications  Orders:    Albumin / creatinine urine ratio    CBC and differential    Comprehensive metabolic panel    Rheumatoid arthritis, involving unspecified site, unspecified whether rheumatoid factor present (HCC)  On methotrexate, follows with rheumatologist  Orders:    Albumin / creatinine urine ratio    CBC and differential    Comprehensive metabolic panel    Overweight with body mass index (BMI) of 26 to 26.9 in adult    We discussed dietary and lifestyle modifications  Orders:    Albumin / creatinine urine ratio    CBC and differential    Comprehensive  "metabolic panel    Abnormal mammogram  Previous ultrasound and diagnostic mammogram reviewed and discussed with the patient and the son.  Encouraged to complete follow-up imaging as recommended.  Orders:    Mammo diagnostic bilateral w 3d and cad; Future    US breast left limited (diagnostic); Future         History of Present Illness     HPI    Patient comes to establish care, for follow-up of chronic medical conditions, review of recent labs and imaging wherever applicable.  Denies any chest pain, shortness of breath, nausea or vomiting.     Patient speaks Stan, her son helps with translation.  Review of Systems   Constitutional:  Negative for appetite change, chills, diaphoresis, fatigue, fever and unexpected weight change.   Respiratory:  Negative for apnea, cough, choking, chest tightness, shortness of breath, wheezing and stridor.    Cardiovascular:  Negative for chest pain, palpitations and leg swelling.   Gastrointestinal:  Negative for abdominal distention, abdominal pain, anal bleeding, blood in stool, constipation, diarrhea, nausea and vomiting.   Genitourinary:  Negative for decreased urine volume, difficulty urinating, frequency and urgency.   Musculoskeletal:  Negative for arthralgias, back pain and myalgias.   Neurological:  Negative for dizziness, light-headedness, numbness and headaches.     Past Medical History[1]  Past Surgical History[2]  Family History[3]  Social History[4]  Medications[5]  No Known Allergies  Immunization History   Administered Date(s) Administered    COVID-19 PFIZER VACCINE 0.3 ML IM 05/16/2021, 06/05/2021    INFLUENZA 10/13/2015, 10/26/2016, 10/12/2017, 10/04/2018    Pneumococcal Conjugate Vaccine 20-valent (Pcv20), Polysace 07/13/2022    Pneumococcal Polysaccharide PPV23 04/05/2021    Tdap 04/05/2021     Objective   /89 (BP Location: Right arm, Patient Position: Sitting, Cuff Size: Standard)   Pulse 92   Temp 98.2 °F (36.8 °C) (Temporal)   Resp 18   Ht 4' 11\" " (1.499 m)   Wt 60.8 kg (134 lb)   SpO2 95%   BMI 27.06 kg/m²     Physical Exam  Constitutional:       General: She is not in acute distress.     Appearance: Normal appearance. She is normal weight. She is not ill-appearing, toxic-appearing or diaphoretic.     Cardiovascular:      Rate and Rhythm: Normal rate and regular rhythm.      Pulses: Normal pulses.      Heart sounds: Normal heart sounds. No murmur heard.     No gallop.   Pulmonary:      Effort: Pulmonary effort is normal. No respiratory distress.      Breath sounds: Normal breath sounds. No stridor. No wheezing, rhonchi or rales.   Chest:      Chest wall: No tenderness.     Musculoskeletal:      Right lower leg: No edema.      Left lower leg: No edema.     Neurological:      Mental Status: She is alert and oriented to person, place, and time.                [1]   Past Medical History:  Diagnosis Date    Diabetes mellitus (HCC)     Hyperlipidemia     Hypertension     Rheumatoid arthritis (HCC)    [2]   Past Surgical History:  Procedure Laterality Date    BREAST CYST EXCISION Right     benign    RI CONIZATION CERVIX W/WO D&C RPR ELTRD EXC N/A 2/12/2024    Procedure: BIOPSY LEEP CERVIX;  Surgeon: Nikkie Rocha MD;  Location:  MAIN OR;  Service: Gynecology    TUBAL LIGATION     [3]   Family History  Problem Relation Name Age of Onset    No Known Problems Mother      No Known Problems Father      No Known Problems Brother      No Known Problems Brother     [4]   Social History  Tobacco Use    Smoking status: Never     Passive exposure: Never    Smokeless tobacco: Never   Vaping Use    Vaping status: Never Used   Substance and Sexual Activity    Alcohol use: Not Currently    Drug use: Never    Sexual activity: Not Currently   [5]   Current Outpatient Medications on File Prior to Visit   Medication Sig    atorvastatin (LIPITOR) 40 mg tablet TAKE 1 TABLET BY MOUTH EVERY DAY    benazepril-hydrochlorthiazide (LOTENSIN HCT) 20-25 MG per tablet Take 1 tablet by  mouth daily    folic acid (FOLVITE) 1 mg tablet Take by mouth in the morning.    metFORMIN (GLUCOPHAGE) 1000 MG tablet Take 1 tablet (1,000 mg total) by mouth daily with breakfast    methotrexate 2.5 MG tablet 3 tablet once a week

## 2025-07-09 NOTE — ASSESSMENT & PLAN NOTE
Stable, continue current doses of statin  Orders:    Albumin / creatinine urine ratio    CBC and differential    Comprehensive metabolic panel    Lipid panel

## 2025-07-12 LAB
ALBUMIN SERPL-MCNC: 4.4 G/DL (ref 3.5–5.7)
ALBUMIN/CREAT UR: NORMAL
ALP SERPL-CCNC: 69 U/L (ref 35–120)
ALT SERPL-CCNC: 22 U/L
ANION GAP SERPL CALCULATED.3IONS-SCNC: 10 MMOL/L (ref 3–11)
AST SERPL-CCNC: 18 U/L
BASOPHILS # BLD AUTO: 0.1 THOU/CMM (ref 0–0.1)
BASOPHILS NFR BLD AUTO: 1 %
BILIRUB SERPL-MCNC: 0.6 MG/DL (ref 0.2–1)
BUN SERPL-MCNC: 24 MG/DL (ref 7–25)
CALCIUM SERPL-MCNC: 9.8 MG/DL (ref 8.5–10.5)
CHLORIDE SERPL-SCNC: 99 MMOL/L (ref 100–109)
CHOLEST SERPL-MCNC: 149 MG/DL
CHOLEST/HDLC SERPL: 3 {RATIO}
CO2 SERPL-SCNC: 28 MMOL/L (ref 21–31)
CREAT SERPL-MCNC: 0.88 MG/DL (ref 0.4–1.1)
CREAT UR-MCNC: 100 MG/DL (ref 50–200)
CYTOLOGY CMNT CVX/VAG CYTO-IMP: ABNORMAL
DIFFERENTIAL METHOD BLD: ABNORMAL
EOSINOPHIL # BLD AUTO: 0.5 THOU/CMM (ref 0–0.5)
EOSINOPHIL NFR BLD AUTO: 6 %
ERYTHROCYTE [DISTWIDTH] IN BLOOD BY AUTOMATED COUNT: 14.6 % (ref 12–16)
GFR/BSA.PRED SERPLBLD CYS-BASED-ARV: 74 ML/MIN/{1.73_M2}
GLUCOSE SERPL-MCNC: 166 MG/DL (ref 65–99)
HCT VFR BLD AUTO: 39.6 % (ref 35–43)
HDLC SERPL-MCNC: 50 MG/DL (ref 23–92)
HGB BLD-MCNC: 13.4 G/DL (ref 11.5–14.5)
LDLC SERPL CALC-MCNC: 82 MG/DL
LYMPHOCYTES # BLD AUTO: 2.2 THOU/CMM (ref 1–3)
LYMPHOCYTES NFR BLD AUTO: 28 %
MCH RBC QN AUTO: 28.8 PG (ref 26–34)
MCHC RBC AUTO-ENTMCNC: 33.8 G/DL (ref 32–37)
MCV RBC AUTO: 85 FL (ref 80–100)
MICROALBUMIN UR-MCNC: <0.7 MG/DL
MONOCYTES # BLD AUTO: 0.8 THOU/CMM (ref 0.3–1)
MONOCYTES NFR BLD AUTO: 10 %
NEUTROPHILS # BLD AUTO: 4.3 THOU/CMM (ref 1.8–7.8)
NEUTROPHILS NFR BLD AUTO: 55 %
NONHDLC SERPL-MCNC: 99 MG/DL
PLATELET # BLD AUTO: 351 THOU/CMM (ref 140–350)
PMV BLD REES-ECKER: 8.9 FL (ref 7.5–11.3)
POTASSIUM SERPL-SCNC: 4.3 MMOL/L (ref 3.5–5.2)
PROT SERPL-MCNC: 7.2 G/DL (ref 6.3–8.3)
RBC # BLD AUTO: 4.65 MILL/CMM (ref 3.7–4.7)
SODIUM SERPL-SCNC: 137 MMOL/L (ref 135–145)
TRIGL SERPL-MCNC: 84 MG/DL
WBC # BLD AUTO: 7.9 THOU/CMM (ref 4–10)

## 2025-07-28 ENCOUNTER — OFFICE VISIT (OUTPATIENT)
Dept: OBGYN CLINIC | Facility: CLINIC | Age: 60
End: 2025-07-28
Payer: COMMERCIAL

## 2025-07-28 VITALS
BODY MASS INDEX: 27.01 KG/M2 | DIASTOLIC BLOOD PRESSURE: 98 MMHG | WEIGHT: 134 LBS | HEIGHT: 59 IN | SYSTOLIC BLOOD PRESSURE: 168 MMHG

## 2025-07-28 DIAGNOSIS — R87.613 HIGH GRADE SQUAMOUS INTRAEPITHELIAL LESION OF CERVIX: Primary | ICD-10-CM

## 2025-07-28 PROCEDURE — 57455 BIOPSY OF CERVIX W/SCOPE: CPT | Performed by: OBSTETRICS & GYNECOLOGY

## 2025-07-28 PROCEDURE — 88305 TISSUE EXAM BY PATHOLOGIST: CPT | Performed by: STUDENT IN AN ORGANIZED HEALTH CARE EDUCATION/TRAINING PROGRAM

## 2025-07-28 PROCEDURE — G0145 SCR C/V CYTO,THINLAYER,RESCR: HCPCS | Performed by: OBSTETRICS & GYNECOLOGY

## 2025-07-28 PROCEDURE — G0476 HPV COMBO ASSAY CA SCREEN: HCPCS | Performed by: OBSTETRICS & GYNECOLOGY

## 2025-07-31 LAB
LAB AP GYN PRIMARY INTERPRETATION: NORMAL
Lab: NORMAL

## 2025-08-01 ENCOUNTER — TELEPHONE (OUTPATIENT)
Age: 60
End: 2025-08-01

## 2025-08-01 DIAGNOSIS — E11.65 TYPE 2 DIABETES MELLITUS WITH HYPERGLYCEMIA, WITHOUT LONG-TERM CURRENT USE OF INSULIN (HCC): ICD-10-CM

## 2025-08-01 PROCEDURE — 88305 TISSUE EXAM BY PATHOLOGIST: CPT | Performed by: STUDENT IN AN ORGANIZED HEALTH CARE EDUCATION/TRAINING PROGRAM

## (undated) DEVICE — PENCIL ELECTROSURG E-Z CLEAN -0035H

## (undated) DEVICE — STRL ALLENTOWN HYSTEROSCOPY PK: Brand: CARDINAL HEALTH

## (undated) DEVICE — CHLORHEXIDINE 4PCT 4 OZ

## (undated) DEVICE — BRUSH CYTO

## (undated) DEVICE — SPECIMEN CONTAINER STERILE PEEL PACK

## (undated) DEVICE — NEEDLE SPINAL 20G X 3.5 LF

## (undated) DEVICE — GLOVE INDICATOR PI UNDERGLOVE SZ 6.5 BLUE

## (undated) DEVICE — ELECTRODE BALL E-Z CLEAN 5 IN -0009

## (undated) DEVICE — 1820 FOAM BLOCK NEEDLE COUNTER: Brand: DEVON

## (undated) DEVICE — TUBING SUCTION 5MM X 12 FT

## (undated) DEVICE — MEDI-VAC YANK SUCT HNDL W/TPRD BULBOUS TIP: Brand: CARDINAL HEALTH

## (undated) DEVICE — PREMIUM DRY TRAY LF: Brand: MEDLINE INDUSTRIES, INC.

## (undated) DEVICE — Device

## (undated) DEVICE — SURGICEL 2 X 3

## (undated) DEVICE — NEEDLE 25G X 1 1/2

## (undated) DEVICE — GLOVE SRG LF STRL BGL SKNSNS 6.5 PF